# Patient Record
Sex: FEMALE
[De-identification: names, ages, dates, MRNs, and addresses within clinical notes are randomized per-mention and may not be internally consistent; named-entity substitution may affect disease eponyms.]

---

## 2018-09-08 ENCOUNTER — APPOINTMENT (OUTPATIENT)
Dept: INTERNAL MEDICINE | Facility: CLINIC | Age: 48
End: 2018-09-08

## 2018-09-29 ENCOUNTER — APPOINTMENT (OUTPATIENT)
Dept: INTERNAL MEDICINE | Facility: CLINIC | Age: 48
End: 2018-09-29
Payer: COMMERCIAL

## 2018-09-29 ENCOUNTER — LABORATORY RESULT (OUTPATIENT)
Age: 48
End: 2018-09-29

## 2018-09-29 VITALS
SYSTOLIC BLOOD PRESSURE: 134 MMHG | OXYGEN SATURATION: 97 % | HEIGHT: 64 IN | DIASTOLIC BLOOD PRESSURE: 86 MMHG | BODY MASS INDEX: 38.58 KG/M2 | TEMPERATURE: 98.8 F | WEIGHT: 226 LBS | HEART RATE: 85 BPM | RESPIRATION RATE: 16 BRPM

## 2018-09-29 DIAGNOSIS — Z82.49 FAMILY HISTORY OF ISCHEMIC HEART DISEASE AND OTHER DISEASES OF THE CIRCULATORY SYSTEM: ICD-10-CM

## 2018-09-29 DIAGNOSIS — Z87.39 PERSONAL HISTORY OF OTHER DISEASES OF THE MUSCULOSKELETAL SYSTEM AND CONNECTIVE TISSUE: ICD-10-CM

## 2018-09-29 DIAGNOSIS — Z81.1 FAMILY HISTORY OF ALCOHOL ABUSE AND DEPENDENCE: ICD-10-CM

## 2018-09-29 DIAGNOSIS — Z78.9 OTHER SPECIFIED HEALTH STATUS: ICD-10-CM

## 2018-09-29 DIAGNOSIS — Z87.19 PERSONAL HISTORY OF OTHER DISEASES OF THE DIGESTIVE SYSTEM: ICD-10-CM

## 2018-09-29 DIAGNOSIS — Z82.61 FAMILY HISTORY OF ARTHRITIS: ICD-10-CM

## 2018-09-29 DIAGNOSIS — H60.541 ACUTE ECZEMATOID OTITIS EXTERNA, RIGHT EAR: ICD-10-CM

## 2018-09-29 DIAGNOSIS — Z83.3 FAMILY HISTORY OF DIABETES MELLITUS: ICD-10-CM

## 2018-09-29 DIAGNOSIS — K92.1 MELENA: ICD-10-CM

## 2018-09-29 PROCEDURE — 36415 COLL VENOUS BLD VENIPUNCTURE: CPT

## 2018-09-29 PROCEDURE — 99204 OFFICE O/P NEW MOD 45 MIN: CPT | Mod: 25

## 2018-09-29 NOTE — PLAN
[FreeTextEntry1] : 47 yr old new Patient; Mult Co- morbidities\par #1 DM type 2- Uncontrolled , last HGB A 1c 9.6, repeat today, Dietary restrictions discussed, FS checking/ Logging, weight loss program\par #2 HTN- Controlled, same meds, low salt diet, weight loss\par #3 Hypothyroidism- Check Thyroid today\par #4 Hyperlipidemia- medication as directed, weight loss, exercise\par #5 Neuropathy- Discussed need to reduce HGB A 1 C , Foot Care by Podiatrist, safety measures\par #6 BMI 38%- Encouraged weight loss program, Weight Watchers diet, exercise\par #7 Blood in stool- Importance of F/U GI , if  significant worsening must go to ED \par #8 Otitis Externa- Keep ear dry as possible, Steroid cream out ear only\par Labs today, Had Flu vaccine at work, , F/U 1 month/ After GI Appt / Re-assess \par \par \par

## 2018-09-29 NOTE — COUNSELING
[Weight management counseling provided] : Weight management [Healthy eating counseling provided] : healthy eating [Weight Self Once Weekly] : Weight self once weekly [Keep Food Diary] : Keep food diary [Low Fat Diet] : Low fat diet [Decrease Portions] : Decrease food portions [Low Salt Diet] : Low salt diet [Patient Non-adherent to care plan] : Patient non-adherent to care plan [Financial issues] : Financial issues [Patient motivation] : Patient motivation [Good understanding] : Patient has a good understanding of lifestyle changes and the steps needed to achieve self management goals

## 2018-09-29 NOTE — PHYSICAL EXAM
[No Acute Distress] : no acute distress [Well Nourished] : well nourished [Well Developed] : well developed [Normal Oropharynx] : the oropharynx was normal [Normal TMs] : both tympanic membranes were normal [No JVD] : no jugular venous distention [No Respiratory Distress] : no respiratory distress  [Clear to Auscultation] : lungs were clear to auscultation bilaterally [Normal Rate] : normal rate  [No Edema] : there was no peripheral edema [No Stool to Guaiac] : no stool to guaiac [Normal Sphincter Tone] : normal sphincter tone [No Mass] : no mass [No CVA Tenderness] : no CVA  tenderness [No Joint Swelling] : no joint swelling [No Rash] : no rash [Normal Gait] : normal gait [Speech Grossly Normal] : speech grossly normal [Memory Grossly Normal] : memory grossly normal [Normal Affect] : the affect was normal [Alert and Oriented x3] : oriented to person, place, and time [de-identified] : +Obese in stature  [de-identified] : +Right ear membrane is duller than left, + Skin excoriated looking outer canal  [FreeTextEntry1] : external hemmorhoids, non swollen, do not appear inflamed , external rash , increase in redness only  [de-identified] : mild rash external anus

## 2018-09-29 NOTE — HISTORY OF PRESENT ILLNESS
[FreeTextEntry8] : 47 yr old presents First time to practice. Recently PCP Dr Paris retired. \par Reports she used to go to another comprehensive clinic but making appt's was too complicated. Presents to office for physical assessment. meds to be ordered , has multiple complaints. \par PMH: DM, HTN, Hyperlipidemia, hypothyroidism. Would like a renewal on her medications.  HgA1c - 9.6 - May 2018. Reports that she is non-compliant with her diabetic diet and does not check her blood glucose even though she is equipped with the glucometer machine, lancet and glucose strips. States that she will make an attempt to check her blood glucose. Realizes she needs to lose weight, but does not make any attempts to do so. Works full- time in [employer] , travels from [place]. \par \par Reports blood in stool  x 1 month, intermittently . Blood noted in stool as recently as yesterday and when she wipes herself. Believes she  may have hemorrhoids. Also reports irritation and white film from right ear canal x 4 months, has been using alcohol and Q tips, +Very itchy according to Patient. \par \par Comprehensive eye exam completed this year. Has not yet gone to Podiatrist for foot care/ assessment/evaluation.\par Influenza vaccine received September 2018 (last week, reported at her job) . Presents with note from Neurologist/ Dr. Lucas complaining of numbness/ tingling in her feet. \par \par \par \par \par \par \par \par \par

## 2018-09-29 NOTE — HEALTH RISK ASSESSMENT
[No falls in past year] : Patient reported no falls in the past year [0] : 2) Feeling down, depressed, or hopeless: Not at all (0) [] : No [de-identified] : none [de-identified] : Neurology  [[id number]] : 0

## 2018-09-29 NOTE — REVIEW OF SYSTEMS
[Vision Problems] : vision problems [Earache] : earache [Lower Ext Edema] : lower extremity edema [Incontinence] : incontinence [Negative] : Heme/Lymph [Back Pain] : back pain [Discharge] : no discharge [Pain] : no pain [Redness] : no redness [Dryness] : no dryness  [Itching] : no itching [Hearing Loss] : no hearing loss [Hoarseness] : no hoarseness [Nasal Discharge] : no nasal discharge [Postnasal Drip] : no postnasal drip [Chest Pain] : no chest pain [Palpitations] : no palpitations [Orthopnea] : no orthopnea [Abdominal Pain] : no abdominal pain [Nausea] : no nausea [Constipation] : no constipation [Diarrhea] : diarrhea [Vomiting] : no vomiting [Heartburn] : no heartburn [FreeTextEntry3] : prescription eyeglass changes x2 with the last year [FreeTextEntry4] : right side ear ache/ +Itchy , white film like discharge [FreeTextEntry7] : blood  in stool and when wiping post bowel movement [FreeTextEntry8] : intermittent incontinence urine

## 2018-10-03 LAB
BASOPHILS # BLD AUTO: 0.02 K/UL
BASOPHILS NFR BLD AUTO: 0.3 %
EOSINOPHIL # BLD AUTO: 0.16 K/UL
EOSINOPHIL NFR BLD AUTO: 2.1 %
HCT VFR BLD CALC: 50.4 %
HGB BLD-MCNC: 14.1 G/DL
IMM GRANULOCYTES NFR BLD AUTO: 0.1 %
LYMPHOCYTES # BLD AUTO: 2.77 K/UL
LYMPHOCYTES NFR BLD AUTO: 36.2 %
MAN DIFF?: NORMAL
MCHC RBC-ENTMCNC: 28 GM/DL
MCHC RBC-ENTMCNC: 29.8 PG
MCV RBC AUTO: 106.6 FL
MONOCYTES # BLD AUTO: 0.26 K/UL
MONOCYTES NFR BLD AUTO: 3.4 %
NEUTROPHILS # BLD AUTO: 4.44 K/UL
NEUTROPHILS NFR BLD AUTO: 57.9 %
PLATELET # BLD AUTO: 212 K/UL
RBC # BLD: 4.73 M/UL
RBC # FLD: 16.3 %
WBC # FLD AUTO: 7.66 K/UL

## 2018-10-05 LAB
ALBUMIN SERPL ELPH-MCNC: 4.6 G/DL
ALP BLD-CCNC: 85 U/L
ALT SERPL-CCNC: 65 U/L
ANION GAP SERPL CALC-SCNC: 14 MMOL/L
AST SERPL-CCNC: 62 U/L
BILIRUB SERPL-MCNC: 0.3 MG/DL
BUN SERPL-MCNC: 16 MG/DL
CALCIUM SERPL-MCNC: 9.6 MG/DL
CHLORIDE SERPL-SCNC: 102 MMOL/L
CHOLEST SERPL-MCNC: 160 MG/DL
CHOLEST/HDLC SERPL: 3.4 RATIO
CO2 SERPL-SCNC: 20 MMOL/L
CREAT SERPL-MCNC: 0.66 MG/DL
GLUCOSE SERPL-MCNC: 263 MG/DL
HBA1C MFR BLD HPLC: 10.6 %
HDLC SERPL-MCNC: 47 MG/DL
LDLC SERPL CALC-MCNC: 78 MG/DL
POTASSIUM SERPL-SCNC: 5.4 MMOL/L
PROT SERPL-MCNC: 7.5 G/DL
SODIUM SERPL-SCNC: 136 MMOL/L
TRIGL SERPL-MCNC: 174 MG/DL
TSH SERPL-ACNC: 1.93 UIU/ML

## 2018-12-08 ENCOUNTER — APPOINTMENT (OUTPATIENT)
Dept: INTERNAL MEDICINE | Facility: CLINIC | Age: 48
End: 2018-12-08
Payer: COMMERCIAL

## 2018-12-08 VITALS
HEART RATE: 84 BPM | BODY MASS INDEX: 37.39 KG/M2 | TEMPERATURE: 98.1 F | OXYGEN SATURATION: 97 % | HEIGHT: 64 IN | WEIGHT: 219 LBS

## 2018-12-08 VITALS — DIASTOLIC BLOOD PRESSURE: 80 MMHG | SYSTOLIC BLOOD PRESSURE: 132 MMHG

## 2018-12-08 DIAGNOSIS — K64.9 UNSPECIFIED HEMORRHOIDS: ICD-10-CM

## 2018-12-08 PROCEDURE — 99214 OFFICE O/P EST MOD 30 MIN: CPT

## 2018-12-08 NOTE — HEALTH RISK ASSESSMENT
[No falls in past year] : Patient reported no falls in the past year [0] : 2) Feeling down, depressed, or hopeless: Not at all (0) [] : No [de-identified] : none [de-identified] : yes gastro [[id number]] : 0

## 2018-12-08 NOTE — HISTORY OF PRESENT ILLNESS
[FreeTextEntry1] : 48 year old female in for F/U DM, Obesity, Hyperlipidemia, Hypothyroidism [de-identified] : 48 yr old  for  F/U. Recently PCP Dr. Paris retired. \par PMH: DM, HTN, Hyperlipidemia, hypothyroidism. Would like a renewal on her medications.  HgA1c -10.6 - September 2018. \par Reports that she is non-compliant with her diabetic diet but is trying to do better. Consumes protein shakes in the am and frozen meal shakes for lunch. Does not check her blood glucose even though she is equipped with the glucometer machine, lancet and glucose strips. Admits that she does not exercise  much. Has upcomin Appt with Endo Specialist which was strongly encouraged \par Request refill on metformin 1000 mg  with 3 refills  and [place] 60 mg. Seen by Gastro : + Hemorrhoids \par States she was non compliant with how she took her metformin- forgot to take medication - most days.\par States that she now compliant with her medication. Presently takes metformin 1/2  in am with a protein shake and the 1/2 in the afternoon with a frozen meal shake then 1000mg  at night.States that this process helps reduces - GI  upset\par Would like a referral - Endocrinologist.\par Works full- time in [employer] , travels from [place]. \par Will come in at the end of the month for repeat Lab work\par Comprehensive eye exam completed this year. Also seen by GI -2018.\par Influenza vaccine received September 2018 (reported at her job).\par \par \par \par \par \par \par \par \par   [FreeTextEntry8] : \par \par \par \par \par \par \par \par \par

## 2018-12-08 NOTE — COUNSELING
[Weight management counseling provided] : Weight management [Healthy eating counseling provided] : healthy eating [Activity counseling provided] : activity [Weight Self Once Weekly] : Weight self once weekly [Keep Food Diary] : Keep food diary [Low Fat Diet] : Low fat diet [Decrease Portions] : Decrease food portions [Low Salt Diet] : Low salt diet [None] : None [Good understanding] : Patient has a good understanding of lifestyle changes and the steps needed to achieve self management goals [de-identified] : Stress reduction

## 2018-12-08 NOTE — PLAN
[FreeTextEntry1] : 48 year old, DM, HTN, obesity, hyperlipidemia\par 1. DM type 2- uncontrolled, last HGA1c- 10.6 - will repeat at end of month, F/U Endo ASAP , Nutritionist needed , will Refer in future if Endo does not provide. \par 2. HTN- controlled with medication, low salt diet, encouraged weight loss\par 3. Hyperlipidemia- medication as directed, weight loss\par 4. BMI 38%- encourage weight loss program (weight watcher) exercise\par F/U in 3-4 weeks for repeat blood draw, medications refilled, Re-assess\par

## 2018-12-08 NOTE — PHYSICAL EXAM
[No Acute Distress] : no acute distress [Well Nourished] : well nourished [Well Developed] : well developed [Well-Appearing] : well-appearing [Normal Sclera/Conjunctiva] : normal sclera/conjunctiva [PERRL] : pupils equal round and reactive to light [EOMI] : extraocular movements intact [Normal Outer Ear/Nose] : the outer ears and nose were normal in appearance [Normal Oropharynx] : the oropharynx was normal [Supple] : supple [No Lymphadenopathy] : no lymphadenopathy [Thyroid Normal, No Nodules] : the thyroid was normal and there were no nodules present [No Respiratory Distress] : no respiratory distress  [Clear to Auscultation] : lungs were clear to auscultation bilaterally [No Accessory Muscle Use] : no accessory muscle use [Normal Rate] : normal rate  [Regular Rhythm] : with a regular rhythm [Normal S1, S2] : normal S1 and S2 [No Murmur] : no murmur heard [No Varicosities] : no varicosities [No Edema] : there was no peripheral edema [No Extremity Clubbing/Cyanosis] : no extremity clubbing/cyanosis [Soft] : abdomen soft [Non Tender] : non-tender [Non-distended] : non-distended [No Masses] : no abdominal mass palpated [Normal Bowel Sounds] : normal bowel sounds [Normal Posterior Cervical Nodes] : no posterior cervical lymphadenopathy [Normal Anterior Cervical Nodes] : no anterior cervical lymphadenopathy [No Spinal Tenderness] : no spinal tenderness [Grossly Normal Strength/Tone] : grossly normal strength/tone [No Rash] : no rash [Normal Gait] : normal gait [Coordination Grossly Intact] : coordination grossly intact [No Focal Deficits] : no focal deficits [Deep Tendon Reflexes (DTR)] : deep tendon reflexes were 2+ and symmetric [Normal Affect] : the affect was normal [Normal Insight/Judgement] : insight and judgment were intact [Speech Grossly Normal] : speech grossly normal [Normal Mood] : the mood was normal [de-identified] : Obese in stature

## 2018-12-08 NOTE — REVIEW OF SYSTEMS
[Vision Problems] : vision problems [Negative] : Psychiatric [Constipation] : no constipation [Vomiting] : no vomiting [Heartburn] : no heartburn [Muscle Weakness] : no muscle weakness [Back Pain] : no back pain [Easy Bleeding] : no easy bleeding [Swollen Glands] : no swollen glands [FreeTextEntry3] : seen by opthalmologist

## 2019-03-15 ENCOUNTER — RX RENEWAL (OUTPATIENT)
Age: 49
End: 2019-03-15

## 2019-05-31 ENCOUNTER — APPOINTMENT (OUTPATIENT)
Dept: INTERNAL MEDICINE | Facility: CLINIC | Age: 49
End: 2019-05-31
Payer: COMMERCIAL

## 2019-05-31 VITALS
WEIGHT: 216 LBS | HEART RATE: 79 BPM | DIASTOLIC BLOOD PRESSURE: 84 MMHG | TEMPERATURE: 97.1 F | OXYGEN SATURATION: 97 % | RESPIRATION RATE: 16 BRPM | HEIGHT: 64 IN | SYSTOLIC BLOOD PRESSURE: 140 MMHG | BODY MASS INDEX: 36.88 KG/M2

## 2019-05-31 PROCEDURE — 99214 OFFICE O/P EST MOD 30 MIN: CPT

## 2019-06-03 NOTE — PLAN
[FreeTextEntry1] : 48 yr old presents F/U \par #1 DM- Needs better control, dietary restrictions, FS Daily/ log, Stressed importance of having Endo Specialist to inquire newer types of meds which may help with weight loss also, Pt agreed \par #2 HTN- Needs better control, low salt diet, meds daily, weight loss\par #3 Hypothyroid- Stable, same dosage for now \par #4 Hyperlipidemia- Statin daily, weight loss, exercise \par F/U 1-2 months ( After Endo ) \par

## 2019-06-03 NOTE — PHYSICAL EXAM
[No Acute Distress] : no acute distress [Well Nourished] : well nourished [Well Developed] : well developed [Well-Appearing] : well-appearing [Normal Sclera/Conjunctiva] : normal sclera/conjunctiva [Normal Oropharynx] : the oropharynx was normal [No JVD] : no jugular venous distention [Supple] : supple [No Respiratory Distress] : no respiratory distress  [No Edema] : there was no peripheral edema [Normal Rate] : normal rate  [Normal Anterior Cervical Nodes] : no anterior cervical lymphadenopathy [No CVA Tenderness] : no CVA  tenderness [No Rash] : no rash [No Joint Swelling] : no joint swelling [Normal Gait] : normal gait [Speech Grossly Normal] : speech grossly normal [Normal Mood] : the mood was normal [de-identified] : Obese in stature

## 2019-06-03 NOTE — COUNSELING
[Weight management counseling provided] : Weight management [Healthy eating counseling provided] : healthy eating [Activity counseling provided] : activity [Target Wt Loss Goal ___] : Target weight loss goal [unfilled] lbs [Weight Self Once Weekly] : Weight self once weekly [Low Fat Diet] : Low fat diet [Low Salt Diet] : Low salt diet [Decrease Portions] : Decrease food portions [Keep Food Diary] : Keep food diary [___ min/wk activity recommended] : [unfilled] min/wk activity recommended [Other: ____] : [unfilled] [Walking] : Walking [Good understanding] : Patient has a good understanding of lifestyle changes and the steps needed to achieve self management goals

## 2019-06-03 NOTE — HISTORY OF PRESENT ILLNESS
[FreeTextEntry1] : F/U Obesity, DM, Hypothyroidism, Hyperlipidemia , Diabetic Neuropathy  [de-identified] : 48 yr old presents for F/U Above, feels well overall. Works full- time in [employer], reports good dietary control during work hours, then late at nights eats  too much and usually junk foods. Has not been able to exercise either, tries to add additional walking into her day as much as possible. Recently went to Eye doctor and reports her vision is declining , looks at computer all day also. Reports running out of BP meds for several days, thus her BP may be elevated today she thinks. Recently seen by Nutritionist who did mult labs ( 24 pages) HGB A 1C 9.7

## 2019-06-03 NOTE — HEALTH RISK ASSESSMENT
[No falls in past year] : Patient reported no falls in the past year [0] : 2) Feeling down, depressed, or hopeless: Not at all (0) [] : No [de-identified] : none [de-identified] : none [de-identified] : Rarely [de-identified] : walking [de-identified] : low carbs [[id number]] : 0

## 2019-07-09 ENCOUNTER — RX RENEWAL (OUTPATIENT)
Age: 49
End: 2019-07-09

## 2019-07-26 ENCOUNTER — APPOINTMENT (OUTPATIENT)
Dept: ENDOCRINOLOGY | Facility: CLINIC | Age: 49
End: 2019-07-26
Payer: COMMERCIAL

## 2019-07-26 VITALS
OXYGEN SATURATION: 98 % | SYSTOLIC BLOOD PRESSURE: 120 MMHG | RESPIRATION RATE: 16 BRPM | HEIGHT: 64 IN | DIASTOLIC BLOOD PRESSURE: 70 MMHG | WEIGHT: 218 LBS | HEART RATE: 78 BPM | BODY MASS INDEX: 37.22 KG/M2

## 2019-07-26 DIAGNOSIS — Z86.39 PERSONAL HISTORY OF OTHER ENDOCRINE, NUTRITIONAL AND METABOLIC DISEASE: ICD-10-CM

## 2019-07-26 DIAGNOSIS — R68.89 OTHER GENERAL SYMPTOMS AND SIGNS: ICD-10-CM

## 2019-07-26 LAB — GLUCOSE BLDC GLUCOMTR-MCNC: 184

## 2019-07-26 PROCEDURE — 95250 CONT GLUC MNTR PHYS/QHP EQP: CPT

## 2019-07-26 PROCEDURE — 99205 OFFICE O/P NEW HI 60 MIN: CPT

## 2019-07-26 PROCEDURE — 95251 CONT GLUC MNTR ANALYSIS I&R: CPT

## 2019-07-26 RX ORDER — METFORMIN HYDROCHLORIDE 1000 MG/1
1000 TABLET, COATED ORAL TWICE DAILY
Qty: 180 | Refills: 0 | Status: DISCONTINUED | COMMUNITY
End: 2019-07-26

## 2019-07-26 RX ORDER — METFORMIN HYDROCHLORIDE 1000 MG/1
1000 TABLET, COATED ORAL
Qty: 180 | Refills: 0 | Status: DISCONTINUED | COMMUNITY
Start: 2018-09-29 | End: 2019-07-26

## 2019-07-26 RX ORDER — BLOOD SUGAR DIAGNOSTIC
STRIP MISCELLANEOUS 3 TIMES DAILY
Qty: 1 | Refills: 2 | Status: ACTIVE | COMMUNITY
Start: 2019-07-26 | End: 1900-01-01

## 2019-07-26 RX ORDER — THYROID, PORCINE 60 MG/1
60 TABLET ORAL DAILY
Refills: 0 | Status: DISCONTINUED | COMMUNITY
End: 2019-07-26

## 2019-07-26 RX ORDER — LANCETS
EACH MISCELLANEOUS
Qty: 1 | Refills: 2 | Status: ACTIVE | COMMUNITY
Start: 2019-07-26 | End: 1900-01-01

## 2019-07-26 RX ORDER — SITAGLIPTIN 100 MG/1
100 TABLET, FILM COATED ORAL DAILY
Refills: 0 | Status: DISCONTINUED | COMMUNITY
End: 2019-07-26

## 2019-07-26 RX ORDER — MOMETASONE FUROATE 1 MG/G
0.1 OINTMENT TOPICAL DAILY
Qty: 1 | Refills: 1 | Status: DISCONTINUED | COMMUNITY
Start: 2018-09-29 | End: 2019-07-26

## 2019-07-26 NOTE — ASSESSMENT
[Carbohydrate Consistent Diet] : carbohydrate consistent diet [Hypoglycemia Management] : hypoglycemia management [Diabetes Foot Care] : diabetes foot care [Long Term Vascular Complications] : long term vascular complications of diabetes [Action and use of Insulin] : action and use of short and long-acting insulin [Self Monitoring of Blood Glucose] : self monitoring of blood glucose [FreeTextEntry1] : Current approaches to diabetes management are discussed with the patient. \par Target ranges for blood sugar, blood pressure and cholesterol reviewed, and risk reduction strategies verified. \par Hypoglycemia precautions reviewed with the patient. \par Suggested extensive diabetes education program, including nutritional and diabetes teaching and evaluation. \par Proper dietary restrictions and exercise routines discussed. 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

## 2019-07-26 NOTE — CONSULT LETTER
[Dear  ___] : Dear ~HAM, [Sincerely,] : Sincerely, [FreeTextEntry1] : Thank you for referring  Ms. LIZZY EDWARD to me for evaluation and treatment. Please, see attached consultation note. As always, if there are specific questions you would like to discuss, please feel free to contact me.\par Thank you for the courtesy of this evaluation.\par  [FreeTextEntry3] : Baldev Lanier MD, FACE, ECNU\par

## 2019-07-26 NOTE — HISTORY OF PRESENT ILLNESS
[FreeTextEntry1] : HISTORY OF PRESENT ILLNESS. \par \par Ms. EDWARD was diagnosed with Diabetes Mellitus Type 2 more than 15 years ago\par Reports history HTN, dyslipidemia, obesity, diabetic neuropathy, hypothyroidism. \par She denies CAD,  known complications of retinopathy, nephropathy. \par Presently on januvia 100 mg, metformin 1000 mg bid, armour 60 mg, benicar 40mg, crestor 10 mg HS\par She's not checking her blood sugars at home. States that forgets to take morning dose of metformin frequently.\par She states that her armour is expensive and wants to be switched to L-thyroxine.\par Fingerstick glucose in the office today is 184  mg/dL  (fasting). \par Diet: not following ADA\par Exercise: none\par \par Last dilated eye - 2018\par Last podiatry visit  - 2018\par Last cardiology evaluation - about 3 years ago\par Last stress test - about 3 years ago (per patient- normal)\par Last 2-D Echo - about 3 years ago (per patient- normal)\par Last nephrology evaluation - none\par Last neurology evaluation- 2018\par \par Lab review: a1c- 9.7, ast/alt- 60/63, LDL- 71, TSH- 2.22, neg celiac screening\par \par

## 2019-08-05 LAB
CREAT 24H UR-MCNC: 0.8 G/24 H
CREAT ?TM UR-MCNC: 77 MG/DL
PROT ?TM UR-MCNC: 24 HR
SPECIMEN VOL 24H UR: 1000 ML

## 2019-08-07 LAB
CORTIS 24H UR-MCNC: 24 H
CORTIS 24H UR-MRATE: 15 MCG/24 H
SPECIMEN VOL 24H UR: 1000 ML

## 2019-08-16 ENCOUNTER — RX RENEWAL (OUTPATIENT)
Age: 49
End: 2019-08-16

## 2019-08-23 ENCOUNTER — APPOINTMENT (OUTPATIENT)
Dept: ENDOCRINOLOGY | Facility: CLINIC | Age: 49
End: 2019-08-23
Payer: COMMERCIAL

## 2019-08-23 PROCEDURE — G0108 DIAB MANAGE TRN  PER INDIV: CPT

## 2019-09-20 ENCOUNTER — MEDICATION RENEWAL (OUTPATIENT)
Age: 49
End: 2019-09-20

## 2019-09-26 ENCOUNTER — MEDICATION RENEWAL (OUTPATIENT)
Age: 49
End: 2019-09-26

## 2019-09-26 RX ORDER — BLOOD-GLUCOSE METER
W/DEVICE EACH MISCELLANEOUS
Qty: 1 | Refills: 0 | Status: ACTIVE | COMMUNITY
Start: 2019-09-26 | End: 1900-01-01

## 2019-10-24 ENCOUNTER — APPOINTMENT (OUTPATIENT)
Dept: ENDOCRINOLOGY | Facility: CLINIC | Age: 49
End: 2019-10-24
Payer: COMMERCIAL

## 2019-10-24 VITALS — DIASTOLIC BLOOD PRESSURE: 80 MMHG | SYSTOLIC BLOOD PRESSURE: 132 MMHG

## 2019-10-24 LAB — GLUCOSE BLDC GLUCOMTR-MCNC: 212

## 2019-10-24 PROCEDURE — 82962 GLUCOSE BLOOD TEST: CPT

## 2019-10-24 PROCEDURE — 99214 OFFICE O/P EST MOD 30 MIN: CPT | Mod: 25

## 2019-10-24 PROCEDURE — 36415 COLL VENOUS BLD VENIPUNCTURE: CPT

## 2019-10-24 NOTE — ASSESSMENT
[Carbohydrate Consistent Diet] : carbohydrate consistent diet [Hypoglycemia Management] : hypoglycemia management [Long Term Vascular Complications] : long term vascular complications of diabetes [Diabetes Foot Care] : diabetes foot care [Self Monitoring of Blood Glucose] : self monitoring of blood glucose [Action and use of Insulin] : action and use of short and long-acting insulin [FreeTextEntry1] : - Soliqua 60 units sq daily (should switch to am)\par - metformin er 500 mg 4 tabs with dinner\par - steglatro 5 mg qd (R+B). hydration\par - Fiasp 6 un ac D. start testing tid ac\par - synthroid 100 mcg\par - continue Benicar, Crestor\par RTC 3 mos\par

## 2019-10-24 NOTE — HISTORY OF PRESENT ILLNESS
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 \par \par Ms. EDWARD was diagnosed with Diabetes Mellitus Type 2 more than 15 years ago\par Reports history HTN, dyslipidemia, obesity, diabetic neuropathy, hypothyroidism. \par She denies CAD,  known complications of retinopathy, nephropathy. \par Presently on januvia 100 mg, metformin 1000 mg bid, armour 60 mg, benicar 40mg, crestor 10 mg HS\par She's not checking her blood sugars at home. States that forgets to take morning dose of metformin frequently.\par She states that her armour is expensive and wants to be switched to L-thyroxine.\par Fingerstick glucose in the office today is 184  mg/dL  (fasting). \par Diet: not following ADA\par Exercise: none\par \par Last dilated eye - 2018\par Last podiatry visit  - 2018\par Last cardiology evaluation - about 3 years ago\par Last stress test - about 3 years ago (per patient- normal)\par Last 2-D Echo - about 3 years ago (per patient- normal)\par Last nephrology evaluation - none\par Last neurology evaluation- 2018\par \par Lab review: a1c- 9.7, ast/alt- 60/63, LDL- 71, TSH- 2.22, neg celiac screening\par \par

## 2019-10-25 LAB
25(OH)D3 SERPL-MCNC: 34.2 NG/ML
CHOLEST SERPL-MCNC: 166 MG/DL
CHOLEST/HDLC SERPL: 3.8 RATIO
ESTIMATED AVERAGE GLUCOSE: 197 MG/DL
FRUCTOSAMINE SERPL-MCNC: 300 UMOL/L
HBA1C MFR BLD HPLC: 8.5 %
HDLC SERPL-MCNC: 44 MG/DL
LDLC SERPL CALC-MCNC: 65 MG/DL
T4 FREE SERPL-MCNC: 1.3 NG/DL
TRIGL SERPL-MCNC: 287 MG/DL
VIT B12 SERPL-MCNC: 715 PG/ML

## 2019-11-26 ENCOUNTER — RX RENEWAL (OUTPATIENT)
Age: 49
End: 2019-11-26

## 2019-11-27 ENCOUNTER — APPOINTMENT (OUTPATIENT)
Dept: DERMATOLOGY | Facility: CLINIC | Age: 49
End: 2019-11-27
Payer: COMMERCIAL

## 2019-11-27 VITALS
DIASTOLIC BLOOD PRESSURE: 78 MMHG | HEIGHT: 64 IN | WEIGHT: 225 LBS | BODY MASS INDEX: 38.41 KG/M2 | SYSTOLIC BLOOD PRESSURE: 128 MMHG

## 2019-11-27 DIAGNOSIS — L73.9 FOLLICULAR DISORDER, UNSPECIFIED: ICD-10-CM

## 2019-11-27 PROCEDURE — 99203 OFFICE O/P NEW LOW 30 MIN: CPT

## 2020-01-10 ENCOUNTER — APPOINTMENT (OUTPATIENT)
Dept: INTERNAL MEDICINE | Facility: CLINIC | Age: 50
End: 2020-01-10
Payer: COMMERCIAL

## 2020-01-10 VITALS
SYSTOLIC BLOOD PRESSURE: 124 MMHG | TEMPERATURE: 97.9 F | OXYGEN SATURATION: 98 % | DIASTOLIC BLOOD PRESSURE: 80 MMHG | WEIGHT: 217 LBS | RESPIRATION RATE: 16 BRPM | BODY MASS INDEX: 37.05 KG/M2 | HEART RATE: 75 BPM | HEIGHT: 64 IN

## 2020-01-10 DIAGNOSIS — Z12.39 ENCOUNTER FOR OTHER SCREENING FOR MALIGNANT NEOPLASM OF BREAST: ICD-10-CM

## 2020-01-10 PROCEDURE — 36415 COLL VENOUS BLD VENIPUNCTURE: CPT

## 2020-01-10 PROCEDURE — 99396 PREV VISIT EST AGE 40-64: CPT | Mod: 25

## 2020-01-11 NOTE — HEALTH RISK ASSESSMENT
[Very Good] : ~his/her~ current health as very good [Good] : ~his/her~  mood as  good [Yes] : Yes [Monthly or less (1 pt)] : Monthly or less (1 point) [Never (0 pts)] : Never (0 points) [1 or 2 (0 pts)] : 1 or 2 (0 points) [No falls in past year] : Patient reported no falls in the past year [No] : In the past 12 months have you used drugs other than those required for medical reasons? No [0] : 2) Feeling down, depressed, or hopeless: Not at all (0) [HIV test declined] : HIV test declined [Hepatitis C test declined] : Hepatitis C test declined [With Family] : lives with family [Employed] : employed [Single] : single [Feels Safe at Home] : Feels safe at home [Smoke Detector] : smoke detector [Carbon Monoxide Detector] : carbon monoxide detector [Safety elements used in home] : safety elements used in home [Sunscreen] : uses sunscreen [Seat Belt] :  uses seat belt [None] : None [College] : College [Fully functional (bathing, dressing, toileting, transferring, walking, feeding)] : Fully functional (bathing, dressing, toileting, transferring, walking, feeding) [Fully functional (using the telephone, shopping, preparing meals, housekeeping, doing laundry, using] : Fully functional and needs no help or supervision to perform IADLs (using the telephone, shopping, preparing meals, housekeeping, doing laundry, using transportation, managing medications and managing finances) [Reports normal functional visual acuity (ie: able to read med bottle)] : Reports normal functional visual acuity [] : No [de-identified] : NONE [de-identified] : DERM AND ENDO [Audit-CScore] : 1 [[id number]] : 0 [de-identified] : low carb [de-identified] : walks when possible  [Learning/Retaining New Information] : denies difficulty learning/retaining new information [Change in mental status noted] : No change in mental status noted [Behavior] : denies difficulty with behavior [Language] : denies difficulty with language [Spatial Ability and Orientation] : denies difficulty with spatial ability and orientation [Reasoning] : denies difficulty with reasoning [Handling Complex Tasks] : denies difficulty handling complex tasks [Sexually Active] : not sexually active [High Risk Behavior] : no high risk behavior [Reports changes in hearing] : Reports no changes in hearing [Reports changes in dental health] : Reports no changes in dental health [Reports changes in vision] : Reports no changes in vision [Caregiver Concerns] : does not have caregiver concerns [Travel to Developing Areas] : does not  travel to developing areas [TB Exposure] : is not being exposed to tuberculosis [Guns at Home] : no guns at home [MammogramDate] : 01/6 [MammogramComments] : r [ColonoscopyDate] : 11/18 [ColonoscopyComments] : repeat in 5 years

## 2020-01-11 NOTE — PLAN
[FreeTextEntry1] : 49 yr old presents CPE\par \par #1 DM- Gaining better control, low carbs, Insulin as directed, F/U Endo\par \par #2 HTN- Controlled, low salt diet, weight loss , exercise \par \par #3 Hyperlipidemia- Low fat diet, weight loss, Statin daily \par \par #4 BMI 37- Continue weight loss program, POrtion control, exercise \par \par Full labs done in office today, Health Screenings discussed \par Need reports Specialist \par f/U 3-4 months

## 2020-01-11 NOTE — PHYSICAL EXAM
[No Acute Distress] : no acute distress [Well Nourished] : well nourished [Normal Sclera/Conjunctiva] : normal sclera/conjunctiva [Well Developed] : well developed [Well-Appearing] : well-appearing [PERRL] : pupils equal round and reactive to light [EOMI] : extraocular movements intact [Normal Oropharynx] : the oropharynx was normal [Normal Outer Ear/Nose] : the outer ears and nose were normal in appearance [No Lymphadenopathy] : no lymphadenopathy [No JVD] : no jugular venous distention [Supple] : supple [Thyroid Normal, No Nodules] : the thyroid was normal and there were no nodules present [No Accessory Muscle Use] : no accessory muscle use [No Respiratory Distress] : no respiratory distress  [Clear to Auscultation] : lungs were clear to auscultation bilaterally [Normal Rate] : normal rate  [Regular Rhythm] : with a regular rhythm [Normal S1, S2] : normal S1 and S2 [No Murmur] : no murmur heard [No Carotid Bruits] : no carotid bruits [No Varicosities] : no varicosities [No Abdominal Bruit] : a ~M bruit was not heard ~T in the abdomen [Pedal Pulses Present] : the pedal pulses are present [No Palpable Aorta] : no palpable aorta [No Edema] : there was no peripheral edema [No Extremity Clubbing/Cyanosis] : no extremity clubbing/cyanosis [Normal Appearance] : normal in appearance [No Nipple Discharge] : no nipple discharge [No Axillary Lymphadenopathy] : no axillary lymphadenopathy [Non Tender] : non-tender [Soft] : abdomen soft [No Masses] : no abdominal mass palpated [Non-distended] : non-distended [Normal Bowel Sounds] : normal bowel sounds [No HSM] : no HSM [Normal Posterior Cervical Nodes] : no posterior cervical lymphadenopathy [Declined Rectal Exam] : declined rectal exam [Normal Anterior Cervical Nodes] : no anterior cervical lymphadenopathy [No CVA Tenderness] : no CVA  tenderness [No Spinal Tenderness] : no spinal tenderness [No Joint Swelling] : no joint swelling [Grossly Normal Strength/Tone] : grossly normal strength/tone [No Rash] : no rash [Coordination Grossly Intact] : coordination grossly intact [No Focal Deficits] : no focal deficits [Speech Grossly Normal] : speech grossly normal [Normal Gait] : normal gait [Deep Tendon Reflexes (DTR)] : deep tendon reflexes were 2+ and symmetric [Normal Affect] : the affect was normal [Memory Grossly Normal] : memory grossly normal [Normal Mood] : the mood was normal [Alert and Oriented x3] : oriented to person, place, and time [Normal Insight/Judgement] : insight and judgment were intact [None] : no ulcers in either foot were found [] : both feet [Scoliosis] : no scoliosis [Kyphosis] : no kyphosis [de-identified] : Obese in stature  [de-identified] : pendelous / Large girth [de-identified] : Went to [facility], needs updated Appt  [de-identified] : slight diminished Elliott toes upper approx #2 #3  [TWNoteComboBox4] : +2 [TWNoteComboBox3] : +2

## 2020-01-11 NOTE — COUNSELING
[Potential consequences of obesity discussed] : Potential consequences of obesity discussed [Benefits of weight loss discussed] : Benefits of weight loss discussed [Structured Weight Management Program suggested:] : Structured weight management program suggested [Encouraged to maintain food diary] : Encouraged to maintain food diary [Encouraged to increase physical activity] : Encouraged to increase physical activity [Encouraged to use exercise tracking device] : Encouraged to use exercise tracking device [Weigh Self Weekly] : weigh self weekly [Decrease Portions] : decrease portions [____ min/wk Activity] : [unfilled] min/wk activity [Keep Food Diary] : keep food diary [Other: ____] : [unfilled] [Good understanding] : Patient has a good understanding of lifestyle changes and steps needed to achieve self management goal [FreeTextEntry2] : Continue weight loss program  [de-identified] : Lifestyle changes, weight loss, exercise

## 2020-01-14 LAB
25(OH)D3 SERPL-MCNC: 54.6 NG/ML
ALBUMIN SERPL ELPH-MCNC: 4.8 G/DL
ALP BLD-CCNC: 76 U/L
ALT SERPL-CCNC: 34 U/L
ANION GAP SERPL CALC-SCNC: 15 MMOL/L
AST SERPL-CCNC: 31 U/L
BASOPHILS # BLD AUTO: 0.05 K/UL
BASOPHILS NFR BLD AUTO: 0.6 %
BILIRUB SERPL-MCNC: 0.4 MG/DL
BUN SERPL-MCNC: 17 MG/DL
CALCIUM SERPL-MCNC: 9.6 MG/DL
CHLORIDE SERPL-SCNC: 103 MMOL/L
CHOLEST SERPL-MCNC: 165 MG/DL
CHOLEST/HDLC SERPL: 3.8 RATIO
CO2 SERPL-SCNC: 22 MMOL/L
CREAT SERPL-MCNC: 0.7 MG/DL
EOSINOPHIL # BLD AUTO: 0.1 K/UL
EOSINOPHIL NFR BLD AUTO: 1.3 %
ESTIMATED AVERAGE GLUCOSE: 148 MG/DL
GLUCOSE SERPL-MCNC: 86 MG/DL
HBA1C MFR BLD HPLC: 6.8 %
HCT VFR BLD CALC: 49.2 %
HDLC SERPL-MCNC: 44 MG/DL
HGB BLD-MCNC: 15.4 G/DL
IMM GRANULOCYTES NFR BLD AUTO: 0.3 %
LDLC SERPL CALC-MCNC: 73 MG/DL
LYMPHOCYTES # BLD AUTO: 3.31 K/UL
LYMPHOCYTES NFR BLD AUTO: 41.8 %
MAN DIFF?: NORMAL
MCHC RBC-ENTMCNC: 29.4 PG
MCHC RBC-ENTMCNC: 31.3 GM/DL
MCV RBC AUTO: 94.1 FL
MONOCYTES # BLD AUTO: 0.51 K/UL
MONOCYTES NFR BLD AUTO: 6.4 %
NEUTROPHILS # BLD AUTO: 3.93 K/UL
NEUTROPHILS NFR BLD AUTO: 49.6 %
PLATELET # BLD AUTO: 243 K/UL
POTASSIUM SERPL-SCNC: 4.2 MMOL/L
PROT SERPL-MCNC: 7.4 G/DL
RBC # BLD: 5.23 M/UL
RBC # FLD: 13.3 %
SODIUM SERPL-SCNC: 140 MMOL/L
TRIGL SERPL-MCNC: 239 MG/DL
TSH SERPL-ACNC: 0.29 UIU/ML
WBC # FLD AUTO: 7.92 K/UL

## 2020-01-23 ENCOUNTER — APPOINTMENT (OUTPATIENT)
Dept: ENDOCRINOLOGY | Facility: CLINIC | Age: 50
End: 2020-01-23
Payer: COMMERCIAL

## 2020-01-23 VITALS
OXYGEN SATURATION: 98 % | SYSTOLIC BLOOD PRESSURE: 130 MMHG | DIASTOLIC BLOOD PRESSURE: 80 MMHG | HEIGHT: 64 IN | WEIGHT: 221 LBS | HEART RATE: 88 BPM | BODY MASS INDEX: 37.73 KG/M2 | RESPIRATION RATE: 16 BRPM

## 2020-01-23 LAB — GLUCOSE BLDC GLUCOMTR-MCNC: 117

## 2020-01-23 PROCEDURE — 82962 GLUCOSE BLOOD TEST: CPT

## 2020-01-23 PROCEDURE — 99214 OFFICE O/P EST MOD 30 MIN: CPT | Mod: 25

## 2020-01-23 NOTE — HISTORY OF PRESENT ILLNESS
[FreeTextEntry1] : 48 yo F f/u for multiple issues\par \par *** Jan 23, 2020 ***\par \par on Soliqua 60 units sq daily,  metformin er 500 mg 4 tabs with dinner,  steglatro 5 mg qd ,  Fiasp 6 un ac D,  synthroid 100 mcg, benicar 40mg, crestor 10 mg HS\par \par a1c- 6.8, TSH- 0.29, LDL- 73, TG- 239\par \par Log: fbs-  116- 167, ac L- 123-127, ac D- 172- 248\par \par *** Oct 24, 2019 ***\par \par on soliqua 57 un qd,  metformin er 500 mg 4 tabs with dinner, synthroid 100 mcg, benicar 40mg, crestor 10 mg HS\par 24h UFC- 15\par no repeat labs yet\par log: fbs- 110-183, with few episodes of mid 200's (when forgets soliqua), p/D- 183-327\par \par HISTORY OF PRESENT ILLNESS. \par \par Ms. EDWARD was diagnosed with Diabetes Mellitus Type 2 more than 15 years ago\par Reports history HTN, dyslipidemia, obesity, diabetic neuropathy, hypothyroidism. \par She denies CAD,  known complications of retinopathy, nephropathy. \par Presently on januvia 100 mg, metformin 1000 mg bid, armour 60 mg, benicar 40mg, crestor 10 mg HS\par She's not checking her blood sugars at home. States that forgets to take morning dose of metformin frequently.\par She states that her armour is expensive and wants to be switched to L-thyroxine.\par Fingerstick glucose in the office today is 184  mg/dL  (fasting). \par Diet: not following ADA\par Exercise: none\par \par Last dilated eye - 2018\par Last podiatry visit  - 2018\par Last cardiology evaluation - about 3 years ago\par Last stress test - about 3 years ago (per patient- normal)\par Last 2-D Echo - about 3 years ago (per patient- normal)\par Last nephrology evaluation - none\par Last neurology evaluation- 2018\par \par Lab review: a1c- 9.7, ast/alt- 60/63, LDL- 71, TSH- 2.22, neg celiac screening\par \par

## 2020-01-23 NOTE — ASSESSMENT
[Carbohydrate Consistent Diet] : carbohydrate consistent diet [Hypoglycemia Management] : hypoglycemia management [Diabetes Foot Care] : diabetes foot care [Long Term Vascular Complications] : long term vascular complications of diabetes [Action and use of Insulin] : action and use of short and long-acting insulin [Self Monitoring of Blood Glucose] : self monitoring of blood glucose [FreeTextEntry1] : - Soliqua 60 units sq daily (should switch to am)\par - metformin er 500 mg 4 tabs with dinner\par - incr steglatro 15 mg qd (R+B). hydration\par - incr  Fiasp 10 un ac D. \par - synthroid 100 mcg, might need adjustment next visit\par - continue Benicar, Crestor\par RTC 3 mos\par

## 2020-01-27 ENCOUNTER — TRANSCRIPTION ENCOUNTER (OUTPATIENT)
Age: 50
End: 2020-01-27

## 2020-01-31 ENCOUNTER — TRANSCRIPTION ENCOUNTER (OUTPATIENT)
Age: 50
End: 2020-01-31

## 2020-02-03 ENCOUNTER — TRANSCRIPTION ENCOUNTER (OUTPATIENT)
Age: 50
End: 2020-02-03

## 2020-02-18 RX ORDER — LANCETS
EACH MISCELLANEOUS 3 TIMES DAILY
Qty: 2 | Refills: 2 | Status: ACTIVE | COMMUNITY
Start: 2019-09-26 | End: 1900-01-01

## 2020-04-08 ENCOUNTER — TRANSCRIPTION ENCOUNTER (OUTPATIENT)
Age: 50
End: 2020-04-08

## 2020-04-15 ENCOUNTER — TRANSCRIPTION ENCOUNTER (OUTPATIENT)
Age: 50
End: 2020-04-15

## 2020-04-27 ENCOUNTER — APPOINTMENT (OUTPATIENT)
Dept: ENDOCRINOLOGY | Facility: CLINIC | Age: 50
End: 2020-04-27
Payer: COMMERCIAL

## 2020-04-27 PROCEDURE — 99214 OFFICE O/P EST MOD 30 MIN: CPT | Mod: 95

## 2020-04-27 NOTE — HISTORY OF PRESENT ILLNESS
[Home] : at home, [unfilled] , at the time of the visit. [Patient] : the patient [Medical Office: ([facility])___] : at the medical office located in  [Self] : self [FreeTextEntry1] : 50 yo F f/u for multiple issues\par \par *** Televisit  Apr 27, 2020 ***\par \par feels well, no new c/o\par on Soliqua 60 units sq daily, metformin er 500 mg 4 tabs with dinner, steglatro 15 mg qd, Fiasp 10 un ac D, synthroid 100 mcg, benicar 40mg, crestor 10 mg HS\par \par Reports Average AM BS  -126, highest 169, lowest 91 ( midday)\par Average PM BS levels - 192, highest 322. lowest 116\par \par a1c - 6.3\par TG- 224, LDL- 70\par \par *** Jan 23, 2020 ***\par \par on Soliqua 60 units sq daily,  metformin er 500 mg 4 tabs with dinner,  steglatro 5 mg qd ,  Fiasp 6 un ac D,  synthroid 100 mcg, benicar 40mg, crestor 10 mg HS\par \par a1c- 6.8, TSH- 0.29, LDL- 73, TG- 239\par \par Log: fbs-  116- 167, ac L- 123-127, ac D- 172- 248\par \par *** Oct 24, 2019 ***\par \par on soliqua 57 un qd,  metformin er 500 mg 4 tabs with dinner, synthroid 100 mcg, benicar 40mg, crestor 10 mg HS\par 24h UFC- 15\par no repeat labs yet\par log: fbs- 110-183, with few episodes of mid 200's (when forgets soliqua), p/D- 183-327\par \par HISTORY OF PRESENT ILLNESS. \par \par Ms. EDWARD was diagnosed with Diabetes Mellitus Type 2 more than 15 years ago\par Reports history HTN, dyslipidemia, obesity, diabetic neuropathy, hypothyroidism. \par She denies CAD,  known complications of retinopathy, nephropathy. \par Presently on januvia 100 mg, metformin 1000 mg bid, armour 60 mg, benicar 40mg, crestor 10 mg HS\par She's not checking her blood sugars at home. States that forgets to take morning dose of metformin frequently.\par She states that her armour is expensive and wants to be switched to L-thyroxine.\par Fingerstick glucose in the office today is 184  mg/dL  (fasting). \par Diet: not following ADA\par Exercise: none\par \par Last dilated eye - 2018\par Last podiatry visit  - 2018\par Last cardiology evaluation - about 3 years ago\par Last stress test - about 3 years ago (per patient- normal)\par Last 2-D Echo - about 3 years ago (per patient- normal)\par Last nephrology evaluation - none\par Last neurology evaluation- 2018\par \par Lab review: a1c- 9.7, ast/alt- 60/63, LDL- 71, TSH- 2.22, neg celiac screening\par \par

## 2020-04-27 NOTE — REASON FOR VISIT
[Follow - Up] : a follow-up visit [DM Type 2] : DM Type 2 [Hypothyroidism] : hypothyroidism [Other___] : [unfilled]

## 2020-04-27 NOTE — ASSESSMENT
[Carbohydrate Consistent Diet] : carbohydrate consistent diet [Hypoglycemia Management] : hypoglycemia management [Diabetes Foot Care] : diabetes foot care [Long Term Vascular Complications] : long term vascular complications of diabetes [Action and use of Insulin] : action and use of short and long-acting insulin [Self Monitoring of Blood Glucose] : self monitoring of blood glucose [FreeTextEntry1] : - Soliqua 60 units sq daily (should switch to am)\par - metformin er 500 mg 4 tabs with dinner, steglatro 15 mg qd,  Fiasp 10 un ac D. \par - synthroid 100 mcg, might need adjustment next visit\par - continue Davian Triplett\par RTC 3 mos\par

## 2020-05-13 ENCOUNTER — APPOINTMENT (OUTPATIENT)
Dept: INTERNAL MEDICINE | Facility: CLINIC | Age: 50
End: 2020-05-13
Payer: COMMERCIAL

## 2020-05-13 PROCEDURE — 99214 OFFICE O/P EST MOD 30 MIN: CPT

## 2020-05-13 NOTE — HEALTH RISK ASSESSMENT
[Yes] : Yes [Monthly or less (1 pt)] : Monthly or less (1 point) [1 or 2 (0 pts)] : 1 or 2 (0 points) [Never (0 pts)] : Never (0 points) [No] : In the past 12 months have you used drugs other than those required for medical reasons? 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

## 2020-05-13 NOTE — PHYSICAL EXAM
[No Acute Distress] : no acute distress [Well Nourished] : well nourished [Well Developed] : well developed [Well-Appearing] : well-appearing [Normal Sclera/Conjunctiva] : normal sclera/conjunctiva [No Rash] : no rash [Speech Grossly Normal] : speech grossly normal [Memory Grossly Normal] : memory grossly normal [Normal Affect] : the affect was normal [Normal Mood] : the mood was normal [Alert and Oriented x3] : oriented to person, place, and time [Normal Insight/Judgement] : insight and judgment were intact [de-identified] : obese in stature  [de-identified] : Respirations normal, speaking clearly  [de-identified] : mood is pleasant as always, Smiling during interview

## 2020-05-13 NOTE — COUNSELING
[Potential consequences of obesity discussed] : Potential consequences of obesity discussed [Benefits of weight loss discussed] : Benefits of weight loss discussed [Encouraged to maintain food diary] : Encouraged to maintain food diary [Structured Weight Management Program suggested:] : Structured weight management program suggested [Encouraged to increase physical activity] : Encouraged to increase physical activity [Weigh Self Weekly] : weigh self weekly [Encouraged to use exercise tracking device] : Encouraged to use exercise tracking device [Decrease Portions] : decrease portions [____ min/wk Activity] : [unfilled] min/wk activity [Keep Food Diary] : keep food diary [Good understanding] : Patient has a good understanding of disease, goals and obesity follow-up plan [FreeTextEntry2] : Importance of increase in activity, has desk type job

## 2020-05-13 NOTE — HISTORY OF PRESENT ILLNESS
[Home] : at home, [unfilled] , at the time of the visit. [Medical Office: ([facility])___] : at the medical office located in  [Patient] : the patient [Self] : self [FreeTextEntry1] : F/U DM, Obesity, HTN, Hyperlipidemia, Hypothyroidism [de-identified] : 49 yr old presents Telehealth/ Video Screen today, feels well overall. Has been working from home during COVID outbreak, lives with Mother. Both have been symptom free. Sees Michael on regular basis which has been very helpful to her, HGB A1C down to 6.3. Struggles with her weight, admits to gaining few pounds since COVID Outbreak, gets out and takes walk sometimes weather permitting. Hope to be going back to work in [place] in few weeks. Reports BS ranges 120- 180,   BP normal readings. Labs reviewed by Dr. Hair / Michael

## 2020-05-13 NOTE — ASSESSMENT
[FreeTextEntry1] : 49 yr old F/U / Telehealth visit\par \par #1 DM- Controlled, continue dietary restrictions, FS Logging, meds daily, importance of increase in activity \par \par #2 HTN- ? Controlled, according to Patient, weight loss, home BP monitoring/ Logging\par \par #3 Hypothyroidism- Controlled, continue Synthroid daily\par \par #4 Hyperlipidemia- Continue Statin daily\par \par F/U 2-3 months in office visit/ Labs \par  meds renewed \par

## 2020-06-05 ENCOUNTER — APPOINTMENT (OUTPATIENT)
Dept: INTERNAL MEDICINE | Facility: CLINIC | Age: 50
End: 2020-06-05
Payer: COMMERCIAL

## 2020-06-05 VITALS
RESPIRATION RATE: 16 BRPM | HEIGHT: 64 IN | WEIGHT: 207 LBS | BODY MASS INDEX: 35.34 KG/M2 | OXYGEN SATURATION: 98 % | TEMPERATURE: 97.8 F | HEART RATE: 90 BPM | DIASTOLIC BLOOD PRESSURE: 82 MMHG | SYSTOLIC BLOOD PRESSURE: 126 MMHG

## 2020-06-05 DIAGNOSIS — M77.8 OTHER ENTHESOPATHIES, NOT ELSEWHERE CLASSIFIED: ICD-10-CM

## 2020-06-05 DIAGNOSIS — Z20.828 CONTACT WITH AND (SUSPECTED) EXPOSURE TO OTHER VIRAL COMMUNICABLE DISEASES: ICD-10-CM

## 2020-06-05 PROCEDURE — 36415 COLL VENOUS BLD VENIPUNCTURE: CPT

## 2020-06-05 PROCEDURE — 99213 OFFICE O/P EST LOW 20 MIN: CPT | Mod: 25

## 2020-06-06 PROBLEM — M77.8 WRIST TENDONITIS: Status: ACTIVE | Noted: 2020-06-06

## 2020-06-06 NOTE — PHYSICAL EXAM
[No Acute Distress] : no acute distress [Well Nourished] : well nourished [Well Developed] : well developed [Well-Appearing] : well-appearing [Normal Sclera/Conjunctiva] : normal sclera/conjunctiva [No JVD] : no jugular venous distention [No Respiratory Distress] : no respiratory distress  [No Accessory Muscle Use] : no accessory muscle use [Normal Rate] : normal rate  [Clear to Auscultation] : lungs were clear to auscultation bilaterally [No CVA Tenderness] : no CVA  tenderness [No Edema] : there was no peripheral edema [No Joint Swelling] : no joint swelling [Coordination Grossly Intact] : coordination grossly intact [No Rash] : no rash [Speech Grossly Normal] : speech grossly normal [Normal Affect] : the affect was normal [Memory Grossly Normal] : memory grossly normal [Normal Mood] : the mood was normal [Alert and Oriented x3] : oriented to person, place, and time [Normal Insight/Judgement] : insight and judgment were intact [de-identified] : wearing mask as per COVID guidelines

## 2020-06-06 NOTE — COUNSELING
[Other: ____] : [unfilled] [Good understanding] : Patient has a good understanding of lifestyle changes and steps needed to achieve self management goal

## 2020-06-06 NOTE — ASSESSMENT
[FreeTextEntry1] : 49 yr old presents today F/U \par \par #1 DM- Gaining much better control, weight loss, exercise, F/U Endo \par \par #2 Wrist Tendernitis- F/U Ortho , Anti -inflammatory / protect stomach \par \par #3 COVID exposure- Will do antibody testing today, discussed risks/ benefits testing\par \par Labs done in office today, will call with results \par \par F/U 3-4 months

## 2020-06-06 NOTE — HEALTH RISK ASSESSMENT
[No] : In the past 12 months have you used drugs other than those required for medical reasons? 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

## 2020-06-06 NOTE — HISTORY OF PRESENT ILLNESS
[FreeTextEntry1] : F/U DM, HTN, COVID testing  [de-identified] : 49 yr old presents for above, feels well overall, recently seen by Dr. Anne / Ortho for Elliott wrist pain , placed on Meloxicam by Ortho. Seen by Endo regular basis, BS doing much better, losing weight.  interested in COVID antibody testing since she believes she had a sick friend over her house and is planning on going back into office soon, has been working from home during Pandemic.

## 2020-06-06 NOTE — REVIEW OF SYSTEMS
[Joint Pain] : joint pain [Negative] : Heme/Lymph [Joint Stiffness] : joint stiffness [FreeTextEntry9] : abilio wrist pain

## 2020-06-07 LAB
SARS-COV-2 IGG SERPL IA-ACNC: <3.8 AU/ML
SARS-COV-2 IGG SERPL QL IA: NEGATIVE

## 2020-09-21 ENCOUNTER — APPOINTMENT (OUTPATIENT)
Dept: ENDOCRINOLOGY | Facility: CLINIC | Age: 50
End: 2020-09-21
Payer: COMMERCIAL

## 2020-09-21 VITALS
DIASTOLIC BLOOD PRESSURE: 70 MMHG | RESPIRATION RATE: 16 BRPM | HEART RATE: 94 BPM | TEMPERATURE: 98.2 F | BODY MASS INDEX: 35.34 KG/M2 | OXYGEN SATURATION: 98 % | SYSTOLIC BLOOD PRESSURE: 112 MMHG | WEIGHT: 207 LBS | HEIGHT: 64 IN

## 2020-09-21 LAB — GLUCOSE BLDC GLUCOMTR-MCNC: 89

## 2020-09-21 PROCEDURE — 82962 GLUCOSE BLOOD TEST: CPT

## 2020-09-21 PROCEDURE — 99214 OFFICE O/P EST MOD 30 MIN: CPT

## 2020-09-21 RX ORDER — BLOOD SUGAR DIAGNOSTIC
STRIP MISCELLANEOUS
Qty: 300 | Refills: 3 | Status: ACTIVE | COMMUNITY
Start: 2019-09-26 | End: 1900-01-01

## 2020-09-21 NOTE — ASSESSMENT
[Carbohydrate Consistent Diet] : carbohydrate consistent diet [Hypoglycemia Management] : hypoglycemia management [Diabetes Foot Care] : diabetes foot care [Long Term Vascular Complications] : long term vascular complications of diabetes [Action and use of Insulin] : action and use of short and long-acting insulin [Self Monitoring of Blood Glucose] : self monitoring of blood glucose [FreeTextEntry1] : - obtain most recent labs\par - Soliqua 60 units sq daily (should switch to am)\par - metformin er 500 mg 4 tabs with dinner, steglatro 15 mg qd,  Fiasp 10 un ac D. \par - synthroid 100 mcg, might need adjustment next visit\par - continue Benicar Crestor\par RTC 3 mos

## 2020-09-21 NOTE — HISTORY OF PRESENT ILLNESS
[FreeTextEntry1] : 50 yo F f/u for multiple issues\par \par *** Sep 21, 2020 ***\par \par on Soliqua 60 units sq daily, metformin er 500 mg 4 tabs with dinner, steglatro 15 mg qd, Humalog 10 un ac D, synthroid 100 mcg, benicar 40mg, crestor 10 mg HS\par off neurontin- "was not working", switched to nabumetone\par reports fbs-  110's, ppg- not checking\par feels well overall. lost 14 lbs since 01/20\par \par \par *** Televisit  Apr 27, 2020 ***\par \par feels well, no new c/o\par on Soliqua 60 units sq daily, metformin er 500 mg 4 tabs with dinner, steglatro 15 mg qd, Fiasp 10 un ac D, synthroid 100 mcg, benicar 40mg, crestor 10 mg HS\par \par Reports Average AM BS  -126, highest 169, lowest 91 ( midday)\par Average PM BS levels - 192, highest 322. lowest 116\par \par a1c - 6.3\par TG- 224, LDL- 70\par \par *** Jan 23, 2020 ***\par \par on Soliqua 60 units sq daily,  metformin er 500 mg 4 tabs with dinner,  steglatro 5 mg qd ,  Fiasp 6 un ac D,  synthroid 100 mcg, benicar 40mg, crestor 10 mg HS\par \par a1c- 6.8, TSH- 0.29, LDL- 73, TG- 239\par \par Log: fbs-  116- 167, ac L- 123-127, ac D- 172- 248\par \par *** Oct 24, 2019 ***\par \par on soliqua 57 un qd,  metformin er 500 mg 4 tabs with dinner, synthroid 100 mcg, benicar 40mg, crestor 10 mg HS\par 24h UFC- 15\par no repeat labs yet\par log: fbs- 110-183, with few episodes of mid 200's (when forgets soliqua), p/D- 183-327\par \par HISTORY OF PRESENT ILLNESS. \par \par Ms. EDWARD was diagnosed with Diabetes Mellitus Type 2 more than 15 years ago\par Reports history HTN, dyslipidemia, obesity, diabetic neuropathy, hypothyroidism. \par She denies CAD,  known complications of retinopathy, nephropathy. \par Presently on januvia 100 mg, metformin 1000 mg bid, armour 60 mg, benicar 40mg, crestor 10 mg HS\par She's not checking her blood sugars at home. States that forgets to take morning dose of metformin frequently.\par She states that her armour is expensive and wants to be switched to L-thyroxine.\par Fingerstick glucose in the office today is 184  mg/dL  (fasting). \par Diet: not following ADA\par Exercise: none\par \par Last dilated eye - 2018\par Last podiatry visit  - 2018\par Last cardiology evaluation - about 3 years ago\par Last stress test - about 3 years ago (per patient- normal)\par Last 2-D Echo - about 3 years ago (per patient- normal)\par Last nephrology evaluation - none\par Last neurology evaluation- 2018\par \par Lab review: a1c- 9.7, ast/alt- 60/63, LDL- 71, TSH- 2.22, neg celiac screening\par \par

## 2020-09-23 ENCOUNTER — RX RENEWAL (OUTPATIENT)
Age: 50
End: 2020-09-23

## 2020-09-28 ENCOUNTER — TRANSCRIPTION ENCOUNTER (OUTPATIENT)
Age: 50
End: 2020-09-28

## 2020-10-12 ENCOUNTER — APPOINTMENT (OUTPATIENT)
Dept: ENDOCRINOLOGY | Facility: CLINIC | Age: 50
End: 2020-10-12
Payer: COMMERCIAL

## 2020-10-12 PROCEDURE — G0108 DIAB MANAGE TRN  PER INDIV: CPT

## 2020-11-03 ENCOUNTER — APPOINTMENT (OUTPATIENT)
Dept: ENDOCRINOLOGY | Facility: CLINIC | Age: 50
End: 2020-11-03
Payer: COMMERCIAL

## 2020-11-03 PROCEDURE — 99072 ADDL SUPL MATRL&STAF TM PHE: CPT

## 2020-11-03 PROCEDURE — G0108 DIAB MANAGE TRN  PER INDIV: CPT

## 2020-12-06 ENCOUNTER — RX RENEWAL (OUTPATIENT)
Age: 50
End: 2020-12-06

## 2020-12-10 ENCOUNTER — RX RENEWAL (OUTPATIENT)
Age: 50
End: 2020-12-10

## 2020-12-11 ENCOUNTER — RX RENEWAL (OUTPATIENT)
Age: 50
End: 2020-12-11

## 2020-12-31 ENCOUNTER — APPOINTMENT (OUTPATIENT)
Dept: ENDOCRINOLOGY | Facility: CLINIC | Age: 50
End: 2020-12-31
Payer: COMMERCIAL

## 2020-12-31 VITALS
HEART RATE: 80 BPM | RESPIRATION RATE: 17 BRPM | WEIGHT: 205 LBS | OXYGEN SATURATION: 98 % | TEMPERATURE: 98 F | HEIGHT: 64 IN | DIASTOLIC BLOOD PRESSURE: 60 MMHG | BODY MASS INDEX: 35 KG/M2 | SYSTOLIC BLOOD PRESSURE: 120 MMHG

## 2020-12-31 LAB — GLUCOSE BLDC GLUCOMTR-MCNC: 114

## 2020-12-31 PROCEDURE — 95251 CONT GLUC MNTR ANALYSIS I&R: CPT

## 2020-12-31 PROCEDURE — 99072 ADDL SUPL MATRL&STAF TM PHE: CPT

## 2020-12-31 PROCEDURE — 99214 OFFICE O/P EST MOD 30 MIN: CPT | Mod: 25

## 2020-12-31 PROCEDURE — 82962 GLUCOSE BLOOD TEST: CPT

## 2020-12-31 NOTE — HISTORY OF PRESENT ILLNESS
[FreeTextEntry1] : 51 yo F f/u for multiple issues\par \par *** Dec 31, 2020 ***\par \par feels great. lost more weight. walks more\par on Soliqua 60 units sq daily, metformin er 500 mg 4 tabs with dinner, steglatro 15 mg qd, Humalog 10 un ac D, synthroid 100 mcg, benicar 40mg, crestor 10 mg HS\par \par a1c- 5.6, LDL- 80, TSH- 0.53, FT4- 1.4\par \par on Dexcom G6\par \par Date of download:  12/31/20\par Diabetes Medications and Dosage: as above\par Indication for CGMS: verify a change in the treatment regimen, identify periods of hypoglycemia/ hyperglycemia. \par Modal day report: pattern. \par Pt with HYPO  1% of the time (0.1% below 54),  93% in target range\par Hyperglycemia: 6 % elevation \par Identified issues: carbohydrate counting\par dates analyzed:10/3/20-12/31/20\par \par *** Sep 21, 2020 ***\par \par on Soliqua 60 units sq daily, metformin er 500 mg 4 tabs with dinner, steglatro 15 mg qd, Humalog 10 un ac D, synthroid 100 mcg, benicar 40mg, crestor 10 mg HS\par off neurontin- "was not working", switched to nabumetone\par reports fbs-  110's, ppg- not checking\par feels well overall. lost 14 lbs since 01/20\par \par \par *** Televisit  Apr 27, 2020 ***\par \par feels well, no new c/o\par on Soliqua 60 units sq daily, metformin er 500 mg 4 tabs with dinner, steglatro 15 mg qd, Fiasp 10 un ac D, synthroid 100 mcg, benicar 40mg, crestor 10 mg HS\par \par Reports Average AM BS  -126, highest 169, lowest 91 ( midday)\par Average PM BS levels - 192, highest 322. lowest 116\par \par a1c - 6.3\par TG- 224, LDL- 70\par \par *** Jan 23, 2020 ***\par \par on Soliqua 60 units sq daily,  metformin er 500 mg 4 tabs with dinner,  steglatro 5 mg qd ,  Fiasp 6 un ac D,  synthroid 100 mcg, benicar 40mg, crestor 10 mg HS\par \par a1c- 6.8, TSH- 0.29, LDL- 73, TG- 239\par \par Log: fbs-  116- 167, ac L- 123-127, ac D- 172- 248\par \par *** Oct 24, 2019 ***\par \par on soliqua 57 un qd,  metformin er 500 mg 4 tabs with dinner, synthroid 100 mcg, benicar 40mg, crestor 10 mg HS\par 24h UFC- 15\par no repeat labs yet\par log: fbs- 110-183, with few episodes of mid 200's (when forgets soliqua), p/D- 183-327\par \par HISTORY OF PRESENT ILLNESS. \par \par Ms. EDWARD was diagnosed with Diabetes Mellitus Type 2 more than 15 years ago\par Reports history HTN, dyslipidemia, obesity, diabetic neuropathy, hypothyroidism. \par She denies CAD,  known complications of retinopathy, nephropathy. \par Presently on januvia 100 mg, metformin 1000 mg bid, armour 60 mg, benicar 40mg, crestor 10 mg HS\par She's not checking her blood sugars at home. States that forgets to take morning dose of metformin frequently.\par She states that her armour is expensive and wants to be switched to L-thyroxine.\par Fingerstick glucose in the office today is 184  mg/dL  (fasting). \par Diet: not following ADA\par Exercise: none\par \par Lab review: a1c- 9.7, ast/alt- 60/63, LDL- 71, TSH- 2.22, neg celiac screening\par \par \par ****\par \par Last dilated eye - 8/20\par Last podiatry visit  - 10/20\par Last cardiology evaluation - about 3 years ago\par Last stress test - about 3 years ago (per patient- normal)\par Last 2-D Echo - about 3 years ago (per patient- normal)\par Last nephrology evaluation - none\par Last neurology evaluation- 2018\par

## 2020-12-31 NOTE — ASSESSMENT
[Carbohydrate Consistent Diet] : carbohydrate consistent diet [Hypoglycemia Management] : hypoglycemia management [Diabetes Foot Care] : diabetes foot care [Long Term Vascular Complications] : long term vascular complications of diabetes [Action and use of Insulin] : action and use of short and long-acting insulin [Self Monitoring of Blood Glucose] : self monitoring of blood glucose [FreeTextEntry1] : 1. DM2, well controlled\par - Soliqua 60 units sq daily (should switch to am)\par - metformin er 500 mg 4 tabs with dinner, steglatro 15 mg qd,  Fiasp 10 un ac D. \par - continue Benicar, Crestor\par \par 2. Hypothyroidism\par - synthroid 100 mcg, might need adjustment next visit\par RTC 3 mos

## 2021-01-04 ENCOUNTER — APPOINTMENT (OUTPATIENT)
Dept: ENDOCRINOLOGY | Facility: CLINIC | Age: 51
End: 2021-01-04
Payer: COMMERCIAL

## 2021-01-04 PROCEDURE — 99072 ADDL SUPL MATRL&STAF TM PHE: CPT

## 2021-01-04 PROCEDURE — G0108 DIAB MANAGE TRN  PER INDIV: CPT

## 2021-02-27 ENCOUNTER — RX RENEWAL (OUTPATIENT)
Age: 51
End: 2021-02-27

## 2021-03-01 ENCOUNTER — NON-APPOINTMENT (OUTPATIENT)
Age: 51
End: 2021-03-01

## 2021-03-01 ENCOUNTER — TRANSCRIPTION ENCOUNTER (OUTPATIENT)
Age: 51
End: 2021-03-01

## 2021-04-05 ENCOUNTER — APPOINTMENT (OUTPATIENT)
Dept: ENDOCRINOLOGY | Facility: CLINIC | Age: 51
End: 2021-04-05
Payer: COMMERCIAL

## 2021-04-05 VITALS
TEMPERATURE: 98.6 F | SYSTOLIC BLOOD PRESSURE: 120 MMHG | BODY MASS INDEX: 35.85 KG/M2 | DIASTOLIC BLOOD PRESSURE: 70 MMHG | WEIGHT: 210 LBS | OXYGEN SATURATION: 98 % | HEART RATE: 107 BPM | HEIGHT: 64 IN | RESPIRATION RATE: 17 BRPM

## 2021-04-05 LAB — GLUCOSE BLDC GLUCOMTR-MCNC: 196

## 2021-04-05 PROCEDURE — 99214 OFFICE O/P EST MOD 30 MIN: CPT | Mod: 25

## 2021-04-05 PROCEDURE — 95251 CONT GLUC MNTR ANALYSIS I&R: CPT

## 2021-04-05 PROCEDURE — 82962 GLUCOSE BLOOD TEST: CPT

## 2021-04-05 PROCEDURE — 99072 ADDL SUPL MATRL&STAF TM PHE: CPT

## 2021-04-05 NOTE — HISTORY OF PRESENT ILLNESS
[FreeTextEntry1] : 49 yo F f/u for multiple issues\par \par *** Apr 05, 2021 ***\par \par feels well, less active b/o the weather, regained some weight. received covid vaccine\par on Soliqua 60 units sq daily, metformin er 500 mg 4 tabs with dinner, steglatro 15 mg qd, Humalog 6-8 un ac D, synthroid 100 mcg, benicar 40mg, crestor 10 mg HS\par \par labs done 2 days ago- still pending\par \par Date of download:  4/5/21\par Diabetes Medications and Dosage: as above\par Indication for CGMS: verify a change in the treatment regimen, identify periods of hypoglycemia/ hyperglycemia. \par Modal day report: pattern. \par Pt with HYPO  1%% of the time (NONE below 54),  94% in target range\par Hyperglycemia:  5% elevation \par Identified issues: carbohydrate counting, occas spikes post dinner and hypos' at night (typically when injects higher amount of humalog)\par dates analyzed: 1/6/21-4/5/21\par \par *** Dec 31, 2020 ***\par \par feels great. lost more weight. walks more\par on Soliqua 60 units sq daily, metformin er 500 mg 4 tabs with dinner, steglatro 15 mg qd, Humalog 10 un ac D, synthroid 100 mcg, benicar 40mg, crestor 10 mg HS\par \par a1c- 5.6, LDL- 80, TSH- 0.53, FT4- 1.4\par \par on Dexcom G6\par \par Date of download:  12/31/20\par Diabetes Medications and Dosage: as above\par Indication for CGMS: verify a change in the treatment regimen, identify periods of hypoglycemia/ hyperglycemia. \par Modal day report: pattern. \par Pt with HYPO  1% of the time (0.1% below 54),  93% in target range\par Hyperglycemia: 6 % elevation \par Identified issues: carbohydrate counting\par dates analyzed:10/3/20-12/31/20\par \par *** Sep 21, 2020 ***\par \par on Soliqua 60 units sq daily, metformin er 500 mg 4 tabs with dinner, steglatro 15 mg qd, Humalog 10 un ac D, synthroid 100 mcg, benicar 40mg, crestor 10 mg HS\par off neurontin- "was not working", switched to nabumetone\par reports fbs-  110's, ppg- not checking\par feels well overall. lost 14 lbs since 01/20\par \par \par *** Televisit  Apr 27, 2020 ***\par \par feels well, no new c/o\par on Soliqua 60 units sq daily, metformin er 500 mg 4 tabs with dinner, steglatro 15 mg qd, Fiasp 10 un ac D, synthroid 100 mcg, benicar 40mg, crestor 10 mg HS\par \par Reports Average AM BS  -126, highest 169, lowest 91 ( midday)\par Average PM BS levels - 192, highest 322. lowest 116\par \par a1c - 6.3\par TG- 224, LDL- 70\par \par *** Jan 23, 2020 ***\par \par on Soliqua 60 units sq daily,  metformin er 500 mg 4 tabs with dinner,  steglatro 5 mg qd ,  Fiasp 6 un ac D,  synthroid 100 mcg, benicar 40mg, crestor 10 mg HS\par \par a1c- 6.8, TSH- 0.29, LDL- 73, TG- 239\par \par Log: fbs-  116- 167, ac L- 123-127, ac D- 172- 248\par \par *** Oct 24, 2019 ***\par \par on soliqua 57 un qd,  metformin er 500 mg 4 tabs with dinner, synthroid 100 mcg, benicar 40mg, crestor 10 mg HS\par 24h UFC- 15\par no repeat labs yet\par log: fbs- 110-183, with few episodes of mid 200's (when forgets soliqua), p/D- 183-327\par \par HISTORY OF PRESENT ILLNESS. \par \par Ms. EDWARD was diagnosed with Diabetes Mellitus Type 2 more than 15 years ago\par Reports history HTN, dyslipidemia, obesity, diabetic neuropathy, hypothyroidism. \par She denies CAD,  known complications of retinopathy, nephropathy. \par Presently on januvia 100 mg, metformin 1000 mg bid, armour 60 mg, benicar 40mg, crestor 10 mg HS\par She's not checking her blood sugars at home. States that forgets to take morning dose of metformin frequently.\par She states that her armour is expensive and wants to be switched to L-thyroxine.\par Fingerstick glucose in the office today is 184  mg/dL  (fasting). \par Diet: not following ADA\par Exercise: none\par \par Lab review: a1c- 9.7, ast/alt- 60/63, LDL- 71, TSH- 2.22, neg celiac screening\par \par \par ****\par \par Last dilated eye - 8/20\par Last podiatry visit  - 10/20\par Last cardiology evaluation - about 3 years ago\par Last stress test - about 3 years ago (per patient- normal)\par Last 2-D Echo - about 3 years ago (per patient- normal)\par Last nephrology evaluation - none\par Last neurology evaluation- 2018\par

## 2021-04-05 NOTE — ASSESSMENT
[Carbohydrate Consistent Diet] : carbohydrate consistent diet [Hypoglycemia Management] : hypoglycemia management [Diabetes Foot Care] : diabetes foot care [Long Term Vascular Complications] : long term vascular complications of diabetes [Action and use of Insulin] : action and use of short and long-acting insulin [Self Monitoring of Blood Glucose] : self monitoring of blood glucose [FreeTextEntry1] : 1. DM2, well controlled\par - Soliqua 60 units sq daily (should switch to am)\par - metformin er 500 mg 4 tabs with dinner, steglatro 15 mg qd, can d/c humalog\par - if still with midnight hypo's - decr soliqua\par - continue Benicar, Crestor\par \par 2. Hypothyroidism\par - synthroid 100 mcg, might need adjustment next visit\par RTC 4 mos

## 2021-04-24 ENCOUNTER — RX RENEWAL (OUTPATIENT)
Age: 51
End: 2021-04-24

## 2021-04-27 ENCOUNTER — TRANSCRIPTION ENCOUNTER (OUTPATIENT)
Age: 51
End: 2021-04-27

## 2021-05-18 ENCOUNTER — APPOINTMENT (OUTPATIENT)
Dept: INTERNAL MEDICINE | Facility: CLINIC | Age: 51
End: 2021-05-18
Payer: COMMERCIAL

## 2021-05-18 VITALS
HEIGHT: 64 IN | OXYGEN SATURATION: 95 % | SYSTOLIC BLOOD PRESSURE: 124 MMHG | HEART RATE: 82 BPM | WEIGHT: 211 LBS | DIASTOLIC BLOOD PRESSURE: 80 MMHG | BODY MASS INDEX: 36.02 KG/M2 | TEMPERATURE: 97.5 F

## 2021-05-18 DIAGNOSIS — Z12.4 ENCOUNTER FOR SCREENING FOR MALIGNANT NEOPLASM OF CERVIX: ICD-10-CM

## 2021-05-18 DIAGNOSIS — Z12.83 ENCOUNTER FOR SCREENING FOR MALIGNANT NEOPLASM OF SKIN: ICD-10-CM

## 2021-05-18 DIAGNOSIS — Z12.11 ENCOUNTER FOR SCREENING FOR MALIGNANT NEOPLASM OF COLON: ICD-10-CM

## 2021-05-18 PROCEDURE — 99396 PREV VISIT EST AGE 40-64: CPT | Mod: 25

## 2021-05-18 PROCEDURE — 99072 ADDL SUPL MATRL&STAF TM PHE: CPT

## 2021-05-18 PROCEDURE — 36415 COLL VENOUS BLD VENIPUNCTURE: CPT

## 2021-05-20 NOTE — HEALTH RISK ASSESSMENT
[Good] : ~his/her~  mood as  good [No] : No [1 or 2 (0 pts)] : 1 or 2 (0 points) [Never (0 pts)] : Never (0 points) [No falls in past year] : Patient reported no falls in the past year [0] : 2) Feeling down, depressed, or hopeless: Not at all (0) [Patient reported mammogram was normal] : Patient reported mammogram was normal [Patient reported colonoscopy was normal] : Patient reported colonoscopy was normal [None] : None [# of Members in Household ___] :  household currently consist of [unfilled] member(s) [Employed] : employed [Single] : single [Reports changes in vision] : Reports changes in vision [Smoke Detector] : smoke detector [Carbon Monoxide Detector] : carbon monoxide detector [Seat Belt] :  uses seat belt [Sunscreen] : uses sunscreen [] : No [de-identified] : none [de-identified] : Endo [Audit-CScore] : 0 [de-identified] : Walking [de-identified] : Low Sugar, low carbs [[id number]] : 0 [HIV test declined] : HIV test declined [Hepatitis C test declined] : Hepatitis C test declined [Change in mental status noted] : No change in mental status noted [Language] : denies difficulty with language [Behavior] : denies difficulty with behavior [Learning/Retaining New Information] : denies difficulty learning/retaining new information [Handling Complex Tasks] : denies difficulty handling complex tasks [Reasoning] : denies difficulty with reasoning [Spatial Ability and Orientation] : denies difficulty with spatial ability and orientation [College] : College [Sexually Active] : not sexually active [High Risk Behavior] : no high risk behavior [Feels Safe at Home] : Feels safe at home [Fully functional (bathing, dressing, toileting, transferring, walking, feeding)] : Fully functional (bathing, dressing, toileting, transferring, walking, feeding) [Fully functional (using the telephone, shopping, preparing meals, housekeeping, doing laundry, using] : Fully functional and needs no help or supervision to perform IADLs (using the telephone, shopping, preparing meals, housekeeping, doing laundry, using transportation, managing medications and managing finances) [Reports changes in hearing] : Reports no changes in hearing [Reports normal functional visual acuity (ie: able to read med bottle)] : Reports normal functional visual acuity [Reports changes in dental health] : Reports no changes in dental health [Guns at Home] : no guns at home [Travel to Developing Areas] : does not  travel to developing areas [TB Exposure] : is not being exposed to tuberculosis [Caregiver Concerns] : does not have caregiver concerns [MammogramDate] : 01/2018 [ColonoscopyDate] : 11/2018 [de-identified] : 2

## 2021-05-20 NOTE — REVIEW OF SYSTEMS
[Negative] : Heme/Lymph [Joint Pain] : joint pain [FreeTextEntry9] : abilio wrist pain comes and goes

## 2021-05-20 NOTE — HISTORY OF PRESENT ILLNESS
[Other: _____] : [unfilled] [FreeTextEntry1] : CPE [de-identified] : 50 yr old presents for above, feels well overall. Stopped Humolog and januvia , Metformin to XR as per Endo Specialist , changed Synthroid to lower dosage also. . Sees Podiatrist yearly, Dr Moy,,, Attends PT for Elliott wrist pain,

## 2021-05-20 NOTE — PHYSICAL EXAM
[No Acute Distress] : no acute distress [Well Nourished] : well nourished [Well Developed] : well developed [Well-Appearing] : well-appearing [Normal Sclera/Conjunctiva] : normal sclera/conjunctiva [PERRL] : pupils equal round and reactive to light [EOMI] : extraocular movements intact [Normal Outer Ear/Nose] : the outer ears and nose were normal in appearance [Normal Oropharynx] : the oropharynx was normal [No JVD] : no jugular venous distention [No Lymphadenopathy] : no lymphadenopathy [Supple] : supple [Thyroid Normal, No Nodules] : the thyroid was normal and there were no nodules present [No Respiratory Distress] : no respiratory distress  [No Accessory Muscle Use] : no accessory muscle use [Clear to Auscultation] : lungs were clear to auscultation bilaterally [Normal Rate] : normal rate  [Regular Rhythm] : with a regular rhythm [Normal S1, S2] : normal S1 and S2 [No Murmur] : no murmur heard [No Carotid Bruits] : no carotid bruits [No Abdominal Bruit] : a ~M bruit was not heard ~T in the abdomen [No Varicosities] : no varicosities [Pedal Pulses Present] : the pedal pulses are present [No Edema] : there was no peripheral edema [No Palpable Aorta] : no palpable aorta [No Extremity Clubbing/Cyanosis] : no extremity clubbing/cyanosis [Normal Appearance] : normal in appearance [No Nipple Discharge] : no nipple discharge [No Axillary Lymphadenopathy] : no axillary lymphadenopathy [Soft] : abdomen soft [Non Tender] : non-tender [Non-distended] : non-distended [No Masses] : no abdominal mass palpated [No HSM] : no HSM [Normal Bowel Sounds] : normal bowel sounds [Declined Rectal Exam] : declined rectal exam [Normal Posterior Cervical Nodes] : no posterior cervical lymphadenopathy [Normal Anterior Cervical Nodes] : no anterior cervical lymphadenopathy [No CVA Tenderness] : no CVA  tenderness [No Spinal Tenderness] : no spinal tenderness [Kyphosis] : no kyphosis [Scoliosis] : no scoliosis [No Joint Swelling] : no joint swelling [Grossly Normal Strength/Tone] : grossly normal strength/tone [No Rash] : no rash [Coordination Grossly Intact] : coordination grossly intact [No Focal Deficits] : no focal deficits [Normal Gait] : normal gait [Deep Tendon Reflexes (DTR)] : deep tendon reflexes were 2+ and symmetric [Speech Grossly Normal] : speech grossly normal [Memory Grossly Normal] : memory grossly normal [Normal Affect] : the affect was normal [Alert and Oriented x3] : oriented to person, place, and time [Normal Mood] : the mood was normal [Normal Insight/Judgement] : insight and judgment were intact [de-identified] : obese in stature  [de-identified] : fair in complexion/ needs full body scanning recommended

## 2021-05-20 NOTE — ASSESSMENT
[FreeTextEntry1] : 50 yr old female CPE\par \par \par Multiple medical conditions, Clinically Stable at this time\par \par  DM- Gaining much better control, F/U Endo, Dietary restrictions, \par meds daily, weight loss/ exercise recommended \par \par Reviewed all Health Screenings recommended / \par \par Labs done in office by MA/ FIT test ordered \par \par Referrals done \par \par F/U 6 months \par \par

## 2021-05-20 NOTE — COUNSELING
[Potential consequences of obesity discussed] : Potential consequences of obesity discussed [Benefits of weight loss discussed] : Benefits of weight loss discussed [Structured Weight Management Program suggested:] : Structured weight management program suggested [Encouraged to maintain food diary] : Encouraged to maintain food diary [Encouraged to increase physical activity] : Encouraged to increase physical activity [Encouraged to use exercise tracking device] : Encouraged to use exercise tracking device [Weigh Self Weekly] : weigh self weekly [Decrease Portions] : decrease portions [____ min/wk Activity] : [unfilled] min/wk activity [Keep Food Diary] : keep food diary [de-identified] : Healthy Lifestyle

## 2021-05-25 LAB
BASOPHILS # BLD AUTO: 0.06 K/UL
BASOPHILS NFR BLD AUTO: 0.7 %
EOSINOPHIL # BLD AUTO: 0.15 K/UL
EOSINOPHIL NFR BLD AUTO: 1.7 %
HCT VFR BLD CALC: 48.5 %
HGB BLD-MCNC: 15.3 G/DL
IMM GRANULOCYTES NFR BLD AUTO: 0.2 %
LYMPHOCYTES # BLD AUTO: 3.39 K/UL
LYMPHOCYTES NFR BLD AUTO: 39.3 %
MAN DIFF?: NORMAL
MCHC RBC-ENTMCNC: 29.8 PG
MCHC RBC-ENTMCNC: 31.5 GM/DL
MCV RBC AUTO: 94.4 FL
MONOCYTES # BLD AUTO: 0.6 K/UL
MONOCYTES NFR BLD AUTO: 7 %
NEUTROPHILS # BLD AUTO: 4.4 K/UL
NEUTROPHILS NFR BLD AUTO: 51.1 %
PLATELET # BLD AUTO: 267 K/UL
RBC # BLD: 5.14 M/UL
RBC # FLD: 14 %
WBC # FLD AUTO: 8.62 K/UL

## 2021-08-09 ENCOUNTER — APPOINTMENT (OUTPATIENT)
Dept: ENDOCRINOLOGY | Facility: CLINIC | Age: 51
End: 2021-08-09
Payer: COMMERCIAL

## 2021-08-09 VITALS
RESPIRATION RATE: 16 BRPM | BODY MASS INDEX: 36.02 KG/M2 | HEIGHT: 64 IN | TEMPERATURE: 98.6 F | HEART RATE: 84 BPM | SYSTOLIC BLOOD PRESSURE: 120 MMHG | DIASTOLIC BLOOD PRESSURE: 64 MMHG | OXYGEN SATURATION: 97 % | WEIGHT: 211 LBS

## 2021-08-09 LAB — GLUCOSE BLDC GLUCOMTR-MCNC: 83

## 2021-08-09 PROCEDURE — 82962 GLUCOSE BLOOD TEST: CPT

## 2021-08-09 PROCEDURE — 95251 CONT GLUC MNTR ANALYSIS I&R: CPT

## 2021-08-09 PROCEDURE — 99214 OFFICE O/P EST MOD 30 MIN: CPT | Mod: 25

## 2021-08-09 NOTE — HISTORY OF PRESENT ILLNESS
[FreeTextEntry1] : 51 yo F f/u for multiple issues\par \par \par *** Aug 09, 2021 ***\par \par on Soliqua 60 units sq daily, metformin er 500 mg 4 tabs with dinner, steglatro 15 mg qd,  synthroid 88 mcg, benicar 40mg, crestor 10 mg HS\par \par Date of download:  8/6/21\par Diabetes Medications and Dosage: as above\par Indication for CGMS: verify a change in the treatment regimen, identify periods of hypoglycemia/ hyperglycemia. \par Modal day report: pattern. \par Pt with HYPO  3% of the time (<1% below 54),  89% in target range\par Hyperglycemia:  7% elevation \par Identified issues: carbohydrate counting\par dates analyzed: 4/21/21-7/19/21\par \par labs from 7/24/21- a1c- 5.6, LDL-80, TSH- 0.87, 25D- 54\par \par *** Apr 05, 2021 ***\par \par feels well, less active b/o the weather, regained some weight. received covid vaccine\par on Soliqua 60 units sq daily, metformin er 500 mg 4 tabs with dinner, steglatro 15 mg qd, Humalog 6-8 un ac D, synthroid 100 mcg, benicar 40mg, crestor 10 mg HS\par \par labs done 2 days ago- still pending\par \par Date of download:  4/5/21\par Diabetes Medications and Dosage: as above\par Indication for CGMS: verify a change in the treatment regimen, identify periods of hypoglycemia/ hyperglycemia. \par Modal day report: pattern. \par Pt with HYPO  1%% of the time (NONE below 54),  94% in target range\par Hyperglycemia:  5% elevation \par Identified issues: carbohydrate counting, occas spikes post dinner and hypos' at night (typically when injects higher amount of humalog)\par dates analyzed: 1/6/21-4/5/21\par \par *** Dec 31, 2020 ***\par \par feels great. lost more weight. walks more\par on Soliqua 60 units sq daily, metformin er 500 mg 4 tabs with dinner, steglatro 15 mg qd, Humalog 10 un ac D, synthroid 100 mcg, benicar 40mg, crestor 10 mg HS\par \par a1c- 5.6, LDL- 80, TSH- 0.53, FT4- 1.4\par \par on Dexcom G6\par \par Date of download:  12/31/20\par Diabetes Medications and Dosage: as above\par Indication for CGMS: verify a change in the treatment regimen, identify periods of hypoglycemia/ hyperglycemia. \par Modal day report: pattern. \par Pt with HYPO  1% of the time (0.1% below 54),  93% in target range\par Hyperglycemia: 6 % elevation \par Identified issues: carbohydrate counting\par dates analyzed:10/3/20-12/31/20\par \par *** Sep 21, 2020 ***\par \par on Soliqua 60 units sq daily, metformin er 500 mg 4 tabs with dinner, steglatro 15 mg qd, Humalog 10 un ac D, synthroid 100 mcg, benicar 40mg, crestor 10 mg HS\par off neurontin- "was not working", switched to nabumetone\par reports fbs-  110's, ppg- not checking\par feels well overall. lost 14 lbs since 01/20\par \par \par *** Televisit  Apr 27, 2020 ***\par \par feels well, no new c/o\par on Soliqua 60 units sq daily, metformin er 500 mg 4 tabs with dinner, steglatro 15 mg qd, Fiasp 10 un ac D, synthroid 100 mcg, benicar 40mg, crestor 10 mg HS\par \par Reports Average AM BS  -126, highest 169, lowest 91 ( midday)\par Average PM BS levels - 192, highest 322. lowest 116\par \par a1c - 6.3\par TG- 224, LDL- 70\par \par *** Jan 23, 2020 ***\par \par on Soliqua 60 units sq daily,  metformin er 500 mg 4 tabs with dinner,  steglatro 5 mg qd ,  Fiasp 6 un ac D,  synthroid 100 mcg, benicar 40mg, crestor 10 mg HS\par \par a1c- 6.8, TSH- 0.29, LDL- 73, TG- 239\par \par Log: fbs-  116- 167, ac L- 123-127, ac D- 172- 248\par \par *** Oct 24, 2019 ***\par \par on soliqua 57 un qd,  metformin er 500 mg 4 tabs with dinner, synthroid 100 mcg, benicar 40mg, crestor 10 mg HS\par 24h UFC- 15\par no repeat labs yet\par log: fbs- 110-183, with few episodes of mid 200's (when forgets soliqua), p/D- 183-327\par \par HISTORY OF PRESENT ILLNESS. \par \par Ms. EDWARD was diagnosed with Diabetes Mellitus Type 2 more than 15 years ago\par Reports history HTN, dyslipidemia, obesity, diabetic neuropathy, hypothyroidism. \par She denies CAD,  known complications of retinopathy, nephropathy. \par Presently on januvia 100 mg, metformin 1000 mg bid, armour 60 mg, benicar 40mg, crestor 10 mg HS\par She's not checking her blood sugars at home. States that forgets to take morning dose of metformin frequently.\par She states that her armour is expensive and wants to be switched to L-thyroxine.\par Fingerstick glucose in the office today is 184  mg/dL  (fasting). \par Diet: not following ADA\par Exercise: none\par \par Lab review: a1c- 9.7, ast/alt- 60/63, LDL- 71, TSH- 2.22, neg celiac screening\par \par \par ****\par \par Last dilated eye - 8/20\par Last podiatry visit  - 10/20\par Last cardiology evaluation - about 3 years ago\par Last stress test - about 3 years ago (per patient- normal)\par Last 2-D Echo - about 3 years ago (per patient- normal)\par Last nephrology evaluation - none\par Last neurology evaluation- 2018\par

## 2021-08-09 NOTE — ASSESSMENT
[Carbohydrate Consistent Diet] : carbohydrate consistent diet [Hypoglycemia Management] : hypoglycemia management [Diabetes Foot Care] : diabetes foot care [Long Term Vascular Complications] : long term vascular complications of diabetes [Action and use of Insulin] : action and use of short and long-acting insulin [Self Monitoring of Blood Glucose] : self monitoring of blood glucose [FreeTextEntry1] : 1. DM2, well controlled\par - can decr Soliqua by 2 un q 3 days and stay around 50-52 units sq daily \par - metformin er 500 mg 4 tabs with dinner, steglatro 15 mg qd\par - continue Benicar, Crestor\par \par 2. Hypothyroidism\par - synthroid 88 mcg\par RTC 4-6 mos

## 2021-11-12 ENCOUNTER — RX RENEWAL (OUTPATIENT)
Age: 51
End: 2021-11-12

## 2021-12-24 ENCOUNTER — RX RENEWAL (OUTPATIENT)
Age: 51
End: 2021-12-24

## 2021-12-24 RX ORDER — BLOOD-GLUCOSE,RECEIVER,CONT
EACH MISCELLANEOUS
Qty: 1 | Refills: 3 | Status: ACTIVE | COMMUNITY
Start: 2020-09-21 | End: 1900-01-01

## 2022-01-12 ENCOUNTER — RX RENEWAL (OUTPATIENT)
Age: 52
End: 2022-01-12

## 2022-02-01 ENCOUNTER — APPOINTMENT (OUTPATIENT)
Dept: INTERNAL MEDICINE | Facility: CLINIC | Age: 52
End: 2022-02-01
Payer: COMMERCIAL

## 2022-02-01 ENCOUNTER — APPOINTMENT (OUTPATIENT)
Dept: ENDOCRINOLOGY | Facility: CLINIC | Age: 52
End: 2022-02-01
Payer: COMMERCIAL

## 2022-02-01 VITALS
HEIGHT: 64 IN | DIASTOLIC BLOOD PRESSURE: 80 MMHG | HEART RATE: 82 BPM | WEIGHT: 212 LBS | BODY MASS INDEX: 36.19 KG/M2 | SYSTOLIC BLOOD PRESSURE: 118 MMHG | TEMPERATURE: 97.3 F | OXYGEN SATURATION: 97 %

## 2022-02-01 VITALS
HEIGHT: 64 IN | DIASTOLIC BLOOD PRESSURE: 60 MMHG | OXYGEN SATURATION: 98 % | SYSTOLIC BLOOD PRESSURE: 122 MMHG | WEIGHT: 212 LBS | RESPIRATION RATE: 17 BRPM | TEMPERATURE: 98.5 F | BODY MASS INDEX: 36.19 KG/M2 | HEART RATE: 88 BPM

## 2022-02-01 LAB — GLUCOSE BLDC GLUCOMTR-MCNC: 92

## 2022-02-01 PROCEDURE — 82962 GLUCOSE BLOOD TEST: CPT

## 2022-02-01 PROCEDURE — 99214 OFFICE O/P EST MOD 30 MIN: CPT

## 2022-02-01 PROCEDURE — 99214 OFFICE O/P EST MOD 30 MIN: CPT | Mod: 25

## 2022-02-01 PROCEDURE — 95251 CONT GLUC MNTR ANALYSIS I&R: CPT

## 2022-02-01 RX ORDER — LEVOTHYROXINE SODIUM 100 UG/1
100 TABLET ORAL
Qty: 90 | Refills: 0 | Status: DISCONTINUED | COMMUNITY
Start: 2020-12-11 | End: 2022-02-01

## 2022-02-01 NOTE — ASSESSMENT
[Carbohydrate Consistent Diet] : carbohydrate consistent diet [Hypoglycemia Management] : hypoglycemia management [Diabetes Foot Care] : diabetes foot care [Long Term Vascular Complications] : long term vascular complications of diabetes [Action and use of Insulin] : action and use of short and long-acting insulin [Self Monitoring of Blood Glucose] : self monitoring of blood glucose [FreeTextEntry1] : 1. DM2, well controlled\par - can decr Soliqua by 2 un q 3 days and stay around 50-52 units sq daily \par - metformin er 500 mg 4 tabs with dinner, steglatro 15 mg qd\par - continue Benicar, Crestor\par - we discussed Plenity to help with weight loss, but she wants to focus on the diet first\par \par 2. Hypothyroidism\par - synthroid 88 mcg\par - TSH today\par RTC 4-6 mos

## 2022-02-01 NOTE — HISTORY OF PRESENT ILLNESS
[FreeTextEntry1] : 52 yo F f/u for multiple issues\par \par *** Feb 01, 2022 ***\par \par feels well, no new c/o\par on Soliqua 57 units sq daily, metformin er 500 mg 4 tabs with dinner, steglatro 15 mg qd,  synthroid 88 mcg, benicar 40mg, crestor 10 mg HS\par wearing dexcom\par Date of download:  2/1/22\par Diabetes Medications and Dosage: as above\par Indication for CGMS: verify a change in the treatment regimen, identify periods of hypoglycemia/ hyperglycemia. \par Modal day report: pattern. \par Pt with HYPO  3% of the time (<1% below 54),  91% in target range\par Hyperglycemia:  6% elevation \par Identified issues: carbohydrate counting\par dates analyzed: 1/19/22-2/1/22\par \par labs from 1/14/22- a1c- 6.1, LDL- 104, creat- 0.74\par \par *** Aug 09, 2021 ***\par \par on Soliqua 60 units sq daily, metformin er 500 mg 4 tabs with dinner, steglatro 15 mg qd,  synthroid 88 mcg, benicar 40mg, crestor 10 mg HS\par \par Date of download:  8/6/21\par Diabetes Medications and Dosage: as above\par Indication for CGMS: verify a change in the treatment regimen, identify periods of hypoglycemia/ hyperglycemia. \par Modal day report: pattern. \par Pt with HYPO  3% of the time (<1% below 54),  89% in target range\par Hyperglycemia:  7% elevation \par Identified issues: carbohydrate counting\par dates analyzed: 4/21/21-7/19/21\par \par labs from 7/24/21- a1c- 5.6, LDL-80, TSH- 0.87, 25D- 54\par \par *** Apr 05, 2021 ***\par \par feels well, less active b/o the weather, regained some weight. received covid vaccine\par on Soliqua 60 units sq daily, metformin er 500 mg 4 tabs with dinner, steglatro 15 mg qd, Humalog 6-8 un ac D, synthroid 100 mcg, benicar 40mg, crestor 10 mg HS\par \par labs done 2 days ago- still pending\par \par Date of download:  4/5/21\par Diabetes Medications and Dosage: as above\par Indication for CGMS: verify a change in the treatment regimen, identify periods of hypoglycemia/ hyperglycemia. \par Modal day report: pattern. \par Pt with HYPO  1%% of the time (NONE below 54),  94% in target range\par Hyperglycemia:  5% elevation \par Identified issues: carbohydrate counting, occas spikes post dinner and hypos' at night (typically when injects higher amount of humalog)\par dates analyzed: 1/6/21-4/5/21\par \par *** Dec 31, 2020 ***\par \par feels great. lost more weight. walks more\par on Soliqua 60 units sq daily, metformin er 500 mg 4 tabs with dinner, steglatro 15 mg qd, Humalog 10 un ac D, synthroid 100 mcg, benicar 40mg, crestor 10 mg HS\par \par a1c- 5.6, LDL- 80, TSH- 0.53, FT4- 1.4\par \par on Dexcom G6\par \par Date of download:  12/31/20\par Diabetes Medications and Dosage: as above\par Indication for CGMS: verify a change in the treatment regimen, identify periods of hypoglycemia/ hyperglycemia. \par Modal day report: pattern. \par Pt with HYPO  1% of the time (0.1% below 54),  93% in target range\par Hyperglycemia: 6 % elevation \par Identified issues: carbohydrate counting\par dates analyzed:10/3/20-12/31/20\par \par *** Sep 21, 2020 ***\par \par on Soliqua 60 units sq daily, metformin er 500 mg 4 tabs with dinner, steglatro 15 mg qd, Humalog 10 un ac D, synthroid 100 mcg, benicar 40mg, crestor 10 mg HS\par off neurontin- "was not working", switched to nabumetone\par reports fbs-  110's, ppg- not checking\par feels well overall. lost 14 lbs since 01/20\par \par \par *** Televisit  Apr 27, 2020 ***\par \par feels well, no new c/o\par on Soliqua 60 units sq daily, metformin er 500 mg 4 tabs with dinner, steglatro 15 mg qd, Fiasp 10 un ac D, synthroid 100 mcg, benicar 40mg, crestor 10 mg HS\par \par Reports Average AM BS  -126, highest 169, lowest 91 ( midday)\par Average PM BS levels - 192, highest 322. lowest 116\par \par a1c - 6.3\par TG- 224, LDL- 70\par \par *** Jan 23, 2020 ***\par \par on Soliqua 60 units sq daily,  metformin er 500 mg 4 tabs with dinner,  steglatro 5 mg qd ,  Fiasp 6 un ac D,  synthroid 100 mcg, benicar 40mg, crestor 10 mg HS\par \par a1c- 6.8, TSH- 0.29, LDL- 73, TG- 239\par \par Log: fbs-  116- 167, ac L- 123-127, ac D- 172- 248\par \par *** Oct 24, 2019 ***\par \par on soliqua 57 un qd,  metformin er 500 mg 4 tabs with dinner, synthroid 100 mcg, benicar 40mg, crestor 10 mg HS\par 24h UFC- 15\par no repeat labs yet\par log: fbs- 110-183, with few episodes of mid 200's (when forgets soliqua), p/D- 183-327\par \par HISTORY OF PRESENT ILLNESS. \par \par Ms. EDWARD was diagnosed with Diabetes Mellitus Type 2 more than 15 years ago\par Reports history HTN, dyslipidemia, obesity, diabetic neuropathy, hypothyroidism. \par She denies CAD,  known complications of retinopathy, nephropathy. \par Presently on januvia 100 mg, metformin 1000 mg bid, armour 60 mg, benicar 40mg, crestor 10 mg HS\par She's not checking her blood sugars at home. States that forgets to take morning dose of metformin frequently.\par She states that her armour is expensive and wants to be switched to L-thyroxine.\par Fingerstick glucose in the office today is 184  mg/dL  (fasting). \par Diet: not following ADA\par Exercise: none\par \par Lab review: a1c- 9.7, ast/alt- 60/63, LDL- 71, TSH- 2.22, neg celiac screening\par \par \par ****\par \par Last dilated eye - 8/20\par Last podiatry visit  - 10/20\par Last cardiology evaluation - about 3 years ago\par Last stress test - about 3 years ago (per patient- normal)\par Last 2-D Echo - about 3 years ago (per patient- normal)\par Last nephrology evaluation - none\par Last neurology evaluation- 2018\par

## 2022-02-02 NOTE — HISTORY OF PRESENT ILLNESS
[FreeTextEntry1] : F/U DM, Obesity , Hyperlipidemia, HTN [de-identified] : 51 yr old presents for above, feels well overall. Presents with labs today  HGB A1C 6.3,, from Endo/ Osteopath/ Reviewed by myself, HGB A1C  DO. 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

## 2022-02-02 NOTE — REVIEW OF SYSTEMS
[Joint Pain] : joint pain [Muscle Pain] : muscle pain [Negative] : Heme/Lymph [FreeTextEntry9] : Left wrist pain

## 2022-02-02 NOTE — HEALTH RISK ASSESSMENT
[Never] : Never [No] : No [No falls in past year] : Patient reported no falls in the past year [de-identified] : none [de-identified] : Endo [de-identified] : tries to walk to train/ [place] [de-identified] : low sweets, low carbs

## 2022-02-02 NOTE — PHYSICAL EXAM
[Normal] : affect was normal and insight and judgment were intact [de-identified] : Obese in stature  [de-identified] : slightly limited ROM Right and left wrist  and Elliott shoulders

## 2022-02-02 NOTE — ASSESSMENT
[FreeTextEntry1] : 51 yr old F/U\par \par \par Multiple medical conditions\par \par Clinically Stable at this time\par \par DM- Much better control, F/U Endo\par weight loss, dietary restricytions\par Exercise \par \par Meds renewed/ Labs reviewed\par \par Re-ordered FIT test \par \par F/U 4-6 months CPE

## 2022-02-02 NOTE — HEALTH RISK ASSESSMENT
[Never] : Never [No] : No [No falls in past year] : Patient reported no falls in the past year [de-identified] : none [de-identified] : Endo [de-identified] : tries to walk to train/ [place] [de-identified] : low sweets, low carbs

## 2022-02-02 NOTE — PHYSICAL EXAM
[Normal] : affect was normal and insight and judgment were intact [de-identified] : Obese in stature  [de-identified] : slightly limited ROM Right and left wrist  and Elliott shoulders

## 2022-02-02 NOTE — HISTORY OF PRESENT ILLNESS
[FreeTextEntry1] : F/U DM, Obesity , Hyperlipidemia, HTN [de-identified] : 51 yr old presents for above, feels well overall. Presents with labs today  HGB A1C 6.3,, from Endo/ Osteopath/ Reviewed by myself, HGB A1C  DO. 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

## 2022-04-26 ENCOUNTER — RX RENEWAL (OUTPATIENT)
Age: 52
End: 2022-04-26

## 2022-06-21 ENCOUNTER — APPOINTMENT (OUTPATIENT)
Dept: ENDOCRINOLOGY | Facility: CLINIC | Age: 52
End: 2022-06-21

## 2022-07-02 ENCOUNTER — TRANSCRIPTION ENCOUNTER (OUTPATIENT)
Age: 52
End: 2022-07-02

## 2022-07-02 ENCOUNTER — APPOINTMENT (OUTPATIENT)
Dept: AFTER HOURS CARE | Facility: EMERGENCY ROOM | Age: 52
End: 2022-07-02

## 2022-07-02 PROCEDURE — 99203 OFFICE O/P NEW LOW 30 MIN: CPT | Mod: 95

## 2022-07-02 NOTE — HISTORY OF PRESENT ILLNESS
[Home] : at home, [unfilled] , at the time of the visit. [Other Location: e.g. Home (Enter Location, City,State)___] : at [unfilled] [Mother] : mother [Verbal consent obtained from patient] : the patient, [unfilled] [FreeTextEntry8] : 50 yo F with hx of DM, HTN, HLD and obesity vaccinated and boosted c/o testing positive for COVID today with s/s including sore throat, low grade fever, runny nose, h/a and malaise.  Pt requesting MAB.  No CP, SOB, abd pain, N/V or diarrhea

## 2022-07-02 NOTE — PHYSICAL EXAM
[No Acute Distress] : no acute distress [Well Nourished] : well nourished [Well Developed] : well developed [Well-Appearing] : well-appearing [Normal Sclera/Conjunctiva] : normal sclera/conjunctiva [No Respiratory Distress] : no respiratory distress  [No Accessory Muscle Use] : no accessory muscle use [No Rash] : no rash [Coordination Grossly Intact] : coordination grossly intact [Normal Affect] : the affect was normal [No Focal Deficits] : no focal deficits [Normal Insight/Judgement] : insight and judgment were intact

## 2022-07-02 NOTE — REVIEW OF SYSTEMS
[Fever] : fever [Fatigue] : fatigue [Nasal Discharge] : nasal discharge [Sore Throat] : sore throat [Cough] : cough [Headache] : headache [Vision Problems] : no vision problems [Chest Pain] : no chest pain [Shortness Of Breath] : no shortness of breath [Wheezing] : no wheezing [Dyspnea on Exertion] : no dyspnea on exertion [Abdominal Pain] : no abdominal pain [Diarrhea] : diarrhea [Vomiting] : no vomiting [Dysuria] : no dysuria [Joint Pain] : no joint pain [Skin Rash] : no skin rash

## 2022-07-02 NOTE — PLAN
[FreeTextEntry1] : Schedule for MAB infusion Supportive and symptomatic care at this time  Instructions and precautions reviewed

## 2022-07-05 ENCOUNTER — OUTPATIENT (OUTPATIENT)
Dept: OUTPATIENT SERVICES | Facility: HOSPITAL | Age: 52
LOS: 1 days | End: 2022-07-05

## 2022-07-05 ENCOUNTER — APPOINTMENT (OUTPATIENT)
Dept: DISASTER EMERGENCY | Facility: HOSPITAL | Age: 52
End: 2022-07-05

## 2022-07-05 VITALS
HEIGHT: 64 IN | WEIGHT: 212.08 LBS | OXYGEN SATURATION: 96 % | TEMPERATURE: 98 F | DIASTOLIC BLOOD PRESSURE: 70 MMHG | SYSTOLIC BLOOD PRESSURE: 103 MMHG | HEART RATE: 91 BPM | RESPIRATION RATE: 19 BRPM

## 2022-07-05 VITALS
HEART RATE: 96 BPM | TEMPERATURE: 98 F | DIASTOLIC BLOOD PRESSURE: 69 MMHG | OXYGEN SATURATION: 97 % | SYSTOLIC BLOOD PRESSURE: 104 MMHG | RESPIRATION RATE: 18 BRPM

## 2022-07-05 DIAGNOSIS — U07.1 COVID-19: ICD-10-CM

## 2022-07-05 RX ORDER — BEBTELOVIMAB 87.5 MG/ML
175 INJECTION, SOLUTION INTRAVENOUS ONCE
Refills: 0 | Status: COMPLETED | OUTPATIENT
Start: 2022-07-05 | End: 2022-07-05

## 2022-07-05 RX ADMIN — BEBTELOVIMAB 175 MILLIGRAM(S): 87.5 INJECTION, SOLUTION INTRAVENOUS at 15:02

## 2022-07-05 NOTE — MONOCLONAL ANTIBODY INFUSION - ASSESSMENT AND PLAN
This is a 51 yrs old female patient with PMHx of DM, HTN, HLD and recently diagnosed with Covid-19 infection who was referred for monoclonal antibody infusion by provider via [facility] after testing positive for COVID 19 on 7/2/22.  Patient states he has been experiencing fever, achy, sore throat, malaise, and nasal congestion since 7/2/22. He denies any CP, SOB, chills, numbness/tingling in b/l limbs, loss of sensation or motor function, N/V/D. Pt is vaccinated and boosted with pfizer x 3.        PLAN:  - Injection procedure explained to patient   - Consent for monoclonal antibody injection obtained   - Risk & benefits discussed/all questions answered  - Inject Bebtelovimab 175 mg over 1 minute.  - Observe patient for one hour post administration    I have reviewed the Bebtelovimab Emergency Use Authorization (EUA) and I have provided the patient or patient's caregiver with the following information:    1. FDA has authorized emergency use Bebtelovimab, which is not an FDA-approved biological product.  2. The patient or patient's caregiver has the option to accept or refuse administration of Bebtelovimab.  3. The significant known and potential risks and benefits of Bebtelovimab and the extent to which such risks and benefits are unknown.  4. Information on available alternative treatments and risks and benefits of those alternatives.    The patient's COVID monoclonal antibody injection administration went well without any complications. The patient tolerated the treatment without any reactions. Vitals were stable throughout the injection & post-injection administration. The pt denies any CP, fevers, chills, SOB, numbness/tingling in b/l limbs, loss of sensation or motor function, N/V/D while receiving the injection. Patient denies any symptoms an hour after post injection. Vitals were taken post injection and were stable. Pt is medically stable to be discharged home. Discharge instructions were provided to the patient with a fact sheet included. Patient was instructed to self-isolate and use infection control measures (e.g wear mask, isolate, social distance, avoid sharing personal items, clean and disinfect "high touch" surfaces, and frequent handwashing according to the CDC guidelines. The patient was informed on what symptoms to be aware of for the next couple of days, and if there are any issues to call the 24/7 clinical call center. Patient was instructed to follow up with PCP as needed.

## 2022-07-05 NOTE — MONOCLONAL ANTIBODY INFUSION - EXAM
CC: Monoclonal Antibody Infusion/COVID 19 Positive  51yFemale    exam/findings:  T(C): --  HR: --  BP: --  RR: --  SpO2: --      PE:   Appearance: NAD	  HEENT:   Normal oral mucosa,   Lymphatic: No lymphadenopathy  Cardiovascular: Normal S1 S2, No JVD, No murmurs, No edema  Respiratory: Lungs clear to auscultation	  Gastrointestinal:  Soft, Non-tender, + BS	  Skin: warm and dry  Neurologic: Non-focal  Extremities: Normal range of motion,

## 2022-07-18 ENCOUNTER — RX RENEWAL (OUTPATIENT)
Age: 52
End: 2022-07-18

## 2022-08-31 ENCOUNTER — APPOINTMENT (OUTPATIENT)
Dept: ENDOCRINOLOGY | Facility: CLINIC | Age: 52
End: 2022-08-31

## 2022-08-31 VITALS
SYSTOLIC BLOOD PRESSURE: 118 MMHG | HEIGHT: 64 IN | RESPIRATION RATE: 17 BRPM | OXYGEN SATURATION: 99 % | DIASTOLIC BLOOD PRESSURE: 80 MMHG | WEIGHT: 207 LBS | BODY MASS INDEX: 35.34 KG/M2 | TEMPERATURE: 98 F | HEART RATE: 84 BPM

## 2022-08-31 LAB — GLUCOSE BLDC GLUCOMTR-MCNC: 111

## 2022-08-31 PROCEDURE — 99214 OFFICE O/P EST MOD 30 MIN: CPT | Mod: 25

## 2022-08-31 PROCEDURE — 95251 CONT GLUC MNTR ANALYSIS I&R: CPT

## 2022-08-31 PROCEDURE — 82962 GLUCOSE BLOOD TEST: CPT

## 2022-08-31 RX ORDER — PEN NEEDLE, DIABETIC 32 GX 1/6"
32G X 4 MM NEEDLE, DISPOSABLE MISCELLANEOUS
Qty: 100 | Refills: 3 | Status: ACTIVE | COMMUNITY
Start: 2022-08-31 | End: 1900-01-01

## 2022-08-31 NOTE — HISTORY OF PRESENT ILLNESS
[FreeTextEntry1] : 50 yo F f/u for multiple issues\par \par *** Aug 31, 2022 ***\par \par feels fine\par  self decreased Soliqua to 35 un qd, metformin er 500 mg 4 tabs with dinner, steglatro 15 mg qd,  synthroid 88 mcg, benicar 40mg, crestor 10 mg HS\par wearing gris\par Date of download:  08/31/2022 \par Diabetes Medications and Dosage: as above\par Indication for CGMS: verify a change in the treatment regimen, identify periods of hypoglycemia/ hyperglycemia. \par Modal day report: pattern. \par Pt with HYPO  0% of the time ( NONE below 54),  78% in target range\par Hyperglycemia:  22% elevation \par Identified issues: carbohydrate counting\par dates analyzed:  08/18/2022- 08/31/2022\par \par labs from 8/16/22- a1c- 6.8%, TSH- 0.439, LDL- 72\par \par *** Feb 01, 2022 ***\par \par feels well, no new c/o\par on Soliqua 57 units sq daily, metformin er 500 mg 4 tabs with dinner, steglatro 15 mg qd,  synthroid 88 mcg, benicar 40mg, crestor 10 mg HS\par wearing dexcom\par Date of download:  2/1/22\par Diabetes Medications and Dosage: as above\par Indication for CGMS: verify a change in the treatment regimen, identify periods of hypoglycemia/ hyperglycemia. \par Modal day report: pattern. \par Pt with HYPO  3% of the time (<1% below 54),  91% in target range\par Hyperglycemia:  6% elevation \par Identified issues: carbohydrate counting\par dates analyzed: 1/19/22-2/1/22\par \par labs from 1/14/22- a1c- 6.1, LDL- 104, creat- 0.74\par \par *** Aug 09, 2021 ***\par \par on Soliqua 60 units sq daily, metformin er 500 mg 4 tabs with dinner, steglatro 15 mg qd,  synthroid 88 mcg, benicar 40mg, crestor 10 mg HS\par \par Date of download:  8/6/21\par Diabetes Medications and Dosage: as above\par Indication for CGMS: verify a change in the treatment regimen, identify periods of hypoglycemia/ hyperglycemia. \par Modal day report: pattern. \par Pt with HYPO  3% of the time (<1% below 54),  89% in target range\par Hyperglycemia:  7% elevation \par Identified issues: carbohydrate counting\par dates analyzed: 4/21/21-7/19/21\par \par labs from 7/24/21- a1c- 5.6, LDL-80, TSH- 0.87, 25D- 54\par \par *** Apr 05, 2021 ***\par \par feels well, less active b/o the weather, regained some weight. received covid vaccine\par on Soliqua 60 units sq daily, metformin er 500 mg 4 tabs with dinner, steglatro 15 mg qd, Humalog 6-8 un ac D, synthroid 100 mcg, benicar 40mg, crestor 10 mg HS\par \par labs done 2 days ago- still pending\par \par Date of download:  4/5/21\par Diabetes Medications and Dosage: as above\par Indication for CGMS: verify a change in the treatment regimen, identify periods of hypoglycemia/ hyperglycemia. \par Modal day report: pattern. \par Pt with HYPO  1%% of the time (NONE below 54),  94% in target range\par Hyperglycemia:  5% elevation \par Identified issues: carbohydrate counting, occas spikes post dinner and hypos' at night (typically when injects higher amount of humalog)\par dates analyzed: 1/6/21-4/5/21\par \par *** Dec 31, 2020 ***\par \par feels great. lost more weight. walks more\par on Soliqua 60 units sq daily, metformin er 500 mg 4 tabs with dinner, steglatro 15 mg qd, Humalog 10 un ac D, synthroid 100 mcg, benicar 40mg, crestor 10 mg HS\par \par a1c- 5.6, LDL- 80, TSH- 0.53, FT4- 1.4\par \par on Dexcom G6\par \par Date of download:  12/31/20\par Diabetes Medications and Dosage: as above\par Indication for CGMS: verify a change in the treatment regimen, identify periods of hypoglycemia/ hyperglycemia. \par Modal day report: pattern. \par Pt with HYPO  1% of the time (0.1% below 54),  93% in target range\par Hyperglycemia: 6 % elevation \par Identified issues: carbohydrate counting\par dates analyzed:10/3/20-12/31/20\par \par *** Sep 21, 2020 ***\par \par on Soliqua 60 units sq daily, metformin er 500 mg 4 tabs with dinner, steglatro 15 mg qd, Humalog 10 un ac D, synthroid 100 mcg, benicar 40mg, crestor 10 mg HS\par off neurontin- "was not working", switched to nabumetone\par reports fbs-  110's, ppg- not checking\par feels well overall. lost 14 lbs since 01/20\par \par \par *** Televisit  Apr 27, 2020 ***\par \par feels well, no new c/o\par on Soliqua 60 units sq daily, metformin er 500 mg 4 tabs with dinner, steglatro 15 mg qd, Fiasp 10 un ac D, synthroid 100 mcg, benicar 40mg, crestor 10 mg HS\par \par Reports Average AM BS  -126, highest 169, lowest 91 ( midday)\par Average PM BS levels - 192, highest 322. lowest 116\par \par a1c - 6.3\par TG- 224, LDL- 70\par \par *** Jan 23, 2020 ***\par \par on Soliqua 60 units sq daily,  metformin er 500 mg 4 tabs with dinner,  steglatro 5 mg qd ,  Fiasp 6 un ac D,  synthroid 100 mcg, benicar 40mg, crestor 10 mg HS\par \par a1c- 6.8, TSH- 0.29, LDL- 73, TG- 239\par \par Log: fbs-  116- 167, ac L- 123-127, ac D- 172- 248\par \par *** Oct 24, 2019 ***\par \par on soliqua 57 un qd,  metformin er 500 mg 4 tabs with dinner, synthroid 100 mcg, benicar 40mg, crestor 10 mg HS\par 24h UFC- 15\par no repeat labs yet\par log: fbs- 110-183, with few episodes of mid 200's (when forgets soliqua), p/D- 183-327\par \par HISTORY OF PRESENT ILLNESS. \par \par Ms. EDWARD was diagnosed with Diabetes Mellitus Type 2 more than 15 years ago\par Reports history HTN, dyslipidemia, obesity, diabetic neuropathy, hypothyroidism. \par She denies CAD,  known complications of retinopathy, nephropathy. \par Presently on januvia 100 mg, metformin 1000 mg bid, armour 60 mg, benicar 40mg, crestor 10 mg HS\par She's not checking her blood sugars at home. States that forgets to take morning dose of metformin frequently.\par She states that her armour is expensive and wants to be switched to L-thyroxine.\par Fingerstick glucose in the office today is 184  mg/dL  (fasting). \par Diet: not following ADA\par Exercise: none\par \par Lab review: a1c- 9.7, ast/alt- 60/63, LDL- 71, TSH- 2.22, neg celiac screening\par \par \par ****\par \par Last dilated eye - 8/22\par Last podiatry visit  - 2021\par Last cardiology evaluation - about 3 years ago\par Last stress test - about 3 years ago (per patient- normal)\par Last 2-D Echo - about 3 years ago (per patient- normal)\par Last nephrology evaluation - none\par Last neurology evaluation- 2018\par

## 2022-08-31 NOTE — ASSESSMENT
[Carbohydrate Consistent Diet] : carbohydrate consistent diet [Hypoglycemia Management] : hypoglycemia management [Diabetes Foot Care] : diabetes foot care [Long Term Vascular Complications] : long term vascular complications of diabetes [Action and use of Insulin] : action and use of short and long-acting insulin [Self Monitoring of Blood Glucose] : self monitoring of blood glucose [FreeTextEntry1] : 1. DM2, suboptimally controlled\par - d/c Soliqua\par - Tresiba 40 un HS, Mounjaro 2.5 mg qw and uptitrate\par - metformin er 500 mg 4 tabs with dinner, steglatro 15 mg qd\par - continue Benicar, Crestor\par - we discussed Plenity to help with weight loss, but she wants to focus on the diet first\par \par 2. Hypothyroidism\par - synthroid 88 mcg\par - monitor TSH \par RTC 4-6 mos

## 2022-10-17 ENCOUNTER — RX RENEWAL (OUTPATIENT)
Age: 52
End: 2022-10-17

## 2022-12-19 ENCOUNTER — NON-APPOINTMENT (OUTPATIENT)
Age: 52
End: 2022-12-19

## 2022-12-19 ENCOUNTER — APPOINTMENT (OUTPATIENT)
Dept: INTERNAL MEDICINE | Facility: CLINIC | Age: 52
End: 2022-12-19

## 2022-12-19 VITALS
DIASTOLIC BLOOD PRESSURE: 80 MMHG | RESPIRATION RATE: 17 BRPM | HEART RATE: 95 BPM | SYSTOLIC BLOOD PRESSURE: 156 MMHG | OXYGEN SATURATION: 98 % | TEMPERATURE: 98.1 F | BODY MASS INDEX: 35.17 KG/M2 | WEIGHT: 206 LBS | HEIGHT: 64 IN

## 2022-12-19 DIAGNOSIS — L72.0 EPIDERMAL CYST: ICD-10-CM

## 2022-12-19 DIAGNOSIS — Z23 ENCOUNTER FOR IMMUNIZATION: ICD-10-CM

## 2022-12-19 DIAGNOSIS — E11.40 TYPE 2 DIABETES MELLITUS WITH DIABETIC NEUROPATHY, UNSPECIFIED: ICD-10-CM

## 2022-12-19 DIAGNOSIS — Z00.00 ENCOUNTER FOR GENERAL ADULT MEDICAL EXAMINATION W/OUT ABNORMAL FINDINGS: ICD-10-CM

## 2022-12-19 PROCEDURE — 99214 OFFICE O/P EST MOD 30 MIN: CPT | Mod: 25

## 2022-12-19 PROCEDURE — 99396 PREV VISIT EST AGE 40-64: CPT | Mod: 25

## 2022-12-19 PROCEDURE — 93000 ELECTROCARDIOGRAM COMPLETE: CPT

## 2022-12-19 RX ORDER — DIFLUNISAL 500 MG/1
500 TABLET, FILM COATED ORAL
Refills: 0 | Status: DISCONTINUED | COMMUNITY
Start: 2022-02-01 | End: 2022-12-19

## 2022-12-19 RX ORDER — COVID-19 ANTIGEN TEST
KIT MISCELLANEOUS
Qty: 8 | Refills: 0 | Status: DISCONTINUED | COMMUNITY
Start: 2022-11-22

## 2022-12-19 RX ORDER — HYDROCORTISONE 25 MG/G
2.5 OINTMENT TOPICAL
Qty: 1 | Refills: 1 | Status: DISCONTINUED | COMMUNITY
Start: 2019-11-27 | End: 2022-12-19

## 2022-12-19 RX ORDER — INDOMETHACIN 50 MG/1
50 CAPSULE ORAL
Qty: 270 | Refills: 0 | Status: ACTIVE | COMMUNITY
Start: 2022-08-04

## 2022-12-19 RX ORDER — FLUOCINONIDE 0.5 MG/ML
0.05 SOLUTION TOPICAL
Qty: 1 | Refills: 5 | Status: DISCONTINUED | COMMUNITY
Start: 2019-11-27 | End: 2022-12-19

## 2022-12-19 RX ORDER — CLINDAMYCIN PHOSPHATE 10 MG/ML
1 LOTION TOPICAL
Qty: 1 | Refills: 10 | Status: DISCONTINUED | COMMUNITY
Start: 2019-11-27 | End: 2022-12-19

## 2022-12-19 RX ORDER — DICLOFENAC SODIUM 1% 10 MG/G
1 GEL TOPICAL
Qty: 200 | Refills: 0 | Status: ACTIVE | COMMUNITY
Start: 2022-09-30

## 2022-12-19 RX ORDER — MINOCYCLINE HYDROCHLORIDE 1 MG/1
1 POWDER ORAL
Qty: 30 | Refills: 0 | Status: DISCONTINUED | COMMUNITY
Start: 2018-08-17 | End: 2022-12-19

## 2022-12-21 ENCOUNTER — NON-APPOINTMENT (OUTPATIENT)
Age: 52
End: 2022-12-21

## 2022-12-21 PROBLEM — Z00.00 ENCOUNTER FOR PREVENTIVE HEALTH EXAMINATION: Status: ACTIVE | Noted: 2018-08-31

## 2022-12-21 PROBLEM — L72.0 EPIDERMAL CYST: Status: ACTIVE | Noted: 2022-12-21

## 2022-12-21 PROBLEM — E11.40 DIABETIC NEUROPATHY: Status: ACTIVE | Noted: 2018-09-29

## 2022-12-21 PROBLEM — Z23 ENCOUNTER FOR IMMUNIZATION: Status: ACTIVE | Noted: 2022-12-21

## 2022-12-21 NOTE — ASSESSMENT
[FreeTextEntry1] : DM2- sees endo, uses dexcom, metformin, steglatro, tresiba, chk labs\par DPN- mild, no meds at this time\par HTN- stable, cont olmesartan, low Na diet\par HLD- stable, chk labs, cont crestor\par Hypothyroid- stable, chk labs, cont synthroid\par Psoriasis- derm f/u, topicals\par Obesity- discussed low calorie diet and exercise as part of a weight loss plan.\par epidermal Cyst- reassured pt. can f/u with derm. \par Discussed healthy lifestyle,discussed and updated vaccinations, healthy diet, exercise, chk labs, discussed appropriate screening tests including colonoscopy, mammo, bone density and pap.\par Discussed the importance of screening for colon cancer. Reviewed screening reccomendations including colonoscopy, Cologuard and Fit DNA testing. I strongly encouraged colonoscopy as that is the best screening test to detect both colon cancer and polyps and is the gold standard.\par Discussed the importance and benefit of a healthy lifestyle including a heart healthy diet such as the Mediterranean diet and regular exercise to try to lose weight as well as 7 hours of sleep nightly.\par see optho yearly\par chk feet nightly\par take all meds as prescribed\par aware of complications of diabetes including but not limited to retinopathy,neuropathy, cvd, esrd,cva,amputations. \par aware of the importance of diet and exercise and maintaining an adequate weight\par chk labs\par

## 2022-12-21 NOTE — PHYSICAL EXAM
[No Acute Distress] : no acute distress [Well Nourished] : well nourished [Well Developed] : well developed [Well-Appearing] : well-appearing [Normal Sclera/Conjunctiva] : normal sclera/conjunctiva [PERRL] : pupils equal round and reactive to light [EOMI] : extraocular movements intact [Normal Outer Ear/Nose] : the outer ears and nose were normal in appearance [Normal Oropharynx] : the oropharynx was normal [No JVD] : no jugular venous distention [No Lymphadenopathy] : no lymphadenopathy [Supple] : supple [Thyroid Normal, No Nodules] : the thyroid was normal and there were no nodules present [No Respiratory Distress] : no respiratory distress  [No Accessory Muscle Use] : no accessory muscle use [Clear to Auscultation] : lungs were clear to auscultation bilaterally [Normal Rate] : normal rate  [Regular Rhythm] : with a regular rhythm [Normal S1, S2] : normal S1 and S2 [No Murmur] : no murmur heard [No Carotid Bruits] : no carotid bruits [No Abdominal Bruit] : a ~M bruit was not heard ~T in the abdomen [No Varicosities] : no varicosities [Pedal Pulses Present] : the pedal pulses are present [No Edema] : there was no peripheral edema [No Palpable Aorta] : no palpable aorta [No Extremity Clubbing/Cyanosis] : no extremity clubbing/cyanosis [Normal Appearance] : normal in appearance [No Nipple Discharge] : no nipple discharge [No Axillary Lymphadenopathy] : no axillary lymphadenopathy [Soft] : abdomen soft [Non Tender] : non-tender [Non-distended] : non-distended [No Masses] : no abdominal mass palpated [No HSM] : no HSM [Normal Bowel Sounds] : normal bowel sounds [Normal Posterior Cervical Nodes] : no posterior cervical lymphadenopathy [Normal Anterior Cervical Nodes] : no anterior cervical lymphadenopathy [No CVA Tenderness] : no CVA  tenderness [No Spinal Tenderness] : no spinal tenderness [No Joint Swelling] : no joint swelling [Grossly Normal Strength/Tone] : grossly normal strength/tone [No Rash] : no rash [Coordination Grossly Intact] : coordination grossly intact [No Focal Deficits] : no focal deficits [Normal Gait] : normal gait [Deep Tendon Reflexes (DTR)] : deep tendon reflexes were 2+ and symmetric [Normal Affect] : the affect was normal [Normal Insight/Judgement] : insight and judgment were intact [de-identified] : left inguinal area- subcut epidermal cyst. 1cm

## 2022-12-21 NOTE — HEALTH RISK ASSESSMENT
[Good] : ~his/her~  mood as  good [Never] : Never [No] : In the past 12 months have you used drugs other than those required for medical reasons? No [No falls in past year] : Patient reported no falls in the past year [0] : 2) Feeling down, depressed, or hopeless: Not at all (0) [PHQ-2 Negative - No further assessment needed] : PHQ-2 Negative - No further assessment needed [Patient reported mammogram was normal] : Patient reported mammogram was normal [None] : None [With Family] : lives with family [Employed] : employed [Single] : single [Fully functional (bathing, dressing, toileting, transferring, walking, feeding)] : Fully functional (bathing, dressing, toileting, transferring, walking, feeding) [Fully functional (using the telephone, shopping, preparing meals, housekeeping, doing laundry, using] : Fully functional and needs no help or supervision to perform IADLs (using the telephone, shopping, preparing meals, housekeeping, doing laundry, using transportation, managing medications and managing finances) [Audit-CScore] : 0 [de-identified] : walks [de-identified] : somewhat healthy [[id number]] : 0 [Change in mental status noted] : No change in mental status noted [Language] : denies difficulty with language [Behavior] : denies difficulty with behavior [Handling Complex Tasks] : denies difficulty handling complex tasks [Reasoning] : denies difficulty with reasoning [Reports changes in hearing] : Reports no changes in hearing [Reports changes in vision] : Reports no changes in vision [Reports changes in dental health] : Reports no changes in dental health [MammogramDate] : 2017 [AdvancecareDate] : 12/19/22

## 2022-12-21 NOTE — HISTORY OF PRESENT ILLNESS
[FreeTextEntry1] : darlene [de-identified] : flonase, zyrtec\par b12 qmonth\par hand arthritis. \par flu vac done\par left lower abd bump. \par post scalp- psoriasis? seeing derm. \par saw ent today.\par dpn. \par

## 2023-01-05 ENCOUNTER — APPOINTMENT (OUTPATIENT)
Dept: ENDOCRINOLOGY | Facility: CLINIC | Age: 53
End: 2023-01-05
Payer: COMMERCIAL

## 2023-01-05 VITALS
BODY MASS INDEX: 34.83 KG/M2 | WEIGHT: 204 LBS | RESPIRATION RATE: 16 BRPM | DIASTOLIC BLOOD PRESSURE: 72 MMHG | SYSTOLIC BLOOD PRESSURE: 118 MMHG | OXYGEN SATURATION: 98 % | TEMPERATURE: 97.3 F | HEIGHT: 64 IN | HEART RATE: 78 BPM

## 2023-01-05 LAB — GLUCOSE BLDC GLUCOMTR-MCNC: 149

## 2023-01-05 PROCEDURE — 95251 CONT GLUC MNTR ANALYSIS I&R: CPT

## 2023-01-05 PROCEDURE — 82962 GLUCOSE BLOOD TEST: CPT

## 2023-01-05 PROCEDURE — 99214 OFFICE O/P EST MOD 30 MIN: CPT | Mod: 25

## 2023-01-05 NOTE — HISTORY OF PRESENT ILLNESS
[FreeTextEntry1] : 53 yo F f/u for multiple issues\par \par *** Jan 05, 2023 ***\par \par stopped Mounjaro after 1 month due to nausea/ GI distress\par had issues with receiving Tresiba, did not resume Soliqua. diet has been worse within the past 2 months\par was also off Crestor for sometime\par taking  metformin er 500 mg 4 tabs with dinner, steglatro 15 mg qd only\par \par Date of download:  01/05/2023 \par Diabetes Medications and Dosage: as above\par Indication for CGMS: verify a change in the treatment regimen, identify periods of hypoglycemia/ hyperglycemia. \par Modal day report: pattern. \par Pt with HYPO  0% of the time ( NONE below 54),  25% in target range\par Hyperglycemia:  75% elevation \par Identified issues: carbohydrate counting\par dates analyzed:  12/23/2022- 01/05/2023\par \par labs from 12/23/22- a1c- 6.3, TG- 363, LDL- 166, TC- 281, TSH- 0.53\par \par *** Aug 31, 2022 ***\par \par feels fine\par  self decreased Soliqua to 35 un qd, metformin er 500 mg 4 tabs with dinner, steglatro 15 mg qd,  synthroid 88 mcg, benicar 40mg, crestor 10 mg HS\par wearing gris\par Date of download:  08/31/2022 \par Diabetes Medications and Dosage: as above\par Indication for CGMS: verify a change in the treatment regimen, identify periods of hypoglycemia/ hyperglycemia. \par Modal day report: pattern. \par Pt with HYPO  0% of the time ( NONE below 54),  78% in target range\par Hyperglycemia:  22% elevation \par Identified issues: carbohydrate counting\par dates analyzed:  08/18/2022- 08/31/2022\par \par labs from 8/16/22- a1c- 6.8%, TSH- 0.439, LDL- 72\par \par *** Feb 01, 2022 ***\par \par feels well, no new c/o\par on Soliqua 57 units sq daily, metformin er 500 mg 4 tabs with dinner, steglatro 15 mg qd,  synthroid 88 mcg, benicar 40mg, crestor 10 mg HS\par wearing dexcom\par Date of download:  2/1/22\par Diabetes Medications and Dosage: as above\par Indication for CGMS: verify a change in the treatment regimen, identify periods of hypoglycemia/ hyperglycemia. \par Modal day report: pattern. \par Pt with HYPO  3% of the time (<1% below 54),  91% in target range\par Hyperglycemia:  6% elevation \par Identified issues: carbohydrate counting\par dates analyzed: 1/19/22-2/1/22\par \par labs from 1/14/22- a1c- 6.1, LDL- 104, creat- 0.74\par \par *** Aug 09, 2021 ***\par \par on Soliqua 60 units sq daily, metformin er 500 mg 4 tabs with dinner, steglatro 15 mg qd,  synthroid 88 mcg, benicar 40mg, crestor 10 mg HS\par \par Date of download:  8/6/21\par Diabetes Medications and Dosage: as above\par Indication for CGMS: verify a change in the treatment regimen, identify periods of hypoglycemia/ hyperglycemia. \par Modal day report: pattern. \par Pt with HYPO  3% of the time (<1% below 54),  89% in target range\par Hyperglycemia:  7% elevation \par Identified issues: carbohydrate counting\par dates analyzed: 4/21/21-7/19/21\par \par labs from 7/24/21- a1c- 5.6, LDL-80, TSH- 0.87, 25D- 54\par \par *** Apr 05, 2021 ***\par \par feels well, less active b/o the weather, regained some weight. received covid vaccine\par on Soliqua 60 units sq daily, metformin er 500 mg 4 tabs with dinner, steglatro 15 mg qd, Humalog 6-8 un ac D, synthroid 100 mcg, benicar 40mg, crestor 10 mg HS\par \par labs done 2 days ago- still pending\par \par Date of download:  4/5/21\par Diabetes Medications and Dosage: as above\par Indication for CGMS: verify a change in the treatment regimen, identify periods of hypoglycemia/ hyperglycemia. \par Modal day report: pattern. \par Pt with HYPO  1%% of the time (NONE below 54),  94% in target range\par Hyperglycemia:  5% elevation \par Identified issues: carbohydrate counting, occas spikes post dinner and hypos' at night (typically when injects higher amount of humalog)\par dates analyzed: 1/6/21-4/5/21\par \par *** Dec 31, 2020 ***\par \par feels great. lost more weight. walks more\par on Soliqua 60 units sq daily, metformin er 500 mg 4 tabs with dinner, steglatro 15 mg qd, Humalog 10 un ac D, synthroid 100 mcg, benicar 40mg, crestor 10 mg HS\par \par a1c- 5.6, LDL- 80, TSH- 0.53, FT4- 1.4\par \par on Dexcom G6\par \par Date of download:  12/31/20\par Diabetes Medications and Dosage: as above\par Indication for CGMS: verify a change in the treatment regimen, identify periods of hypoglycemia/ hyperglycemia. \par Modal day report: pattern. \par Pt with HYPO  1% of the time (0.1% below 54),  93% in target range\par Hyperglycemia: 6 % elevation \par Identified issues: carbohydrate counting\par dates analyzed:10/3/20-12/31/20\par \par *** Sep 21, 2020 ***\par \par on Soliqua 60 units sq daily, metformin er 500 mg 4 tabs with dinner, steglatro 15 mg qd, Humalog 10 un ac D, synthroid 100 mcg, benicar 40mg, crestor 10 mg HS\par off neurontin- "was not working", switched to nabumetone\par reports fbs-  110's, ppg- not checking\par feels well overall. lost 14 lbs since 01/20\par \par \par *** Televisit  Apr 27, 2020 ***\par \par feels well, no new c/o\par on Soliqua 60 units sq daily, metformin er 500 mg 4 tabs with dinner, steglatro 15 mg qd, Fiasp 10 un ac D, synthroid 100 mcg, benicar 40mg, crestor 10 mg HS\par \par Reports Average AM BS  -126, highest 169, lowest 91 ( midday)\par Average PM BS levels - 192, highest 322. lowest 116\par \par a1c - 6.3\par TG- 224, LDL- 70\par \par *** Jan 23, 2020 ***\par \par on Soliqua 60 units sq daily,  metformin er 500 mg 4 tabs with dinner,  steglatro 5 mg qd ,  Fiasp 6 un ac D,  synthroid 100 mcg, benicar 40mg, crestor 10 mg HS\par \par a1c- 6.8, TSH- 0.29, LDL- 73, TG- 239\par \par Log: fbs-  116- 167, ac L- 123-127, ac D- 172- 248\par \par *** Oct 24, 2019 ***\par \par on soliqua 57 un qd,  metformin er 500 mg 4 tabs with dinner, synthroid 100 mcg, benicar 40mg, crestor 10 mg HS\par 24h UFC- 15\par no repeat labs yet\par log: fbs- 110-183, with few episodes of mid 200's (when forgets soliqua), p/D- 183-327\par \par HISTORY OF PRESENT ILLNESS. \par \par Ms. EDWARD was diagnosed with Diabetes Mellitus Type 2 more than 15 years ago\par Reports history HTN, dyslipidemia, obesity, diabetic neuropathy, hypothyroidism. \par She denies CAD,  known complications of retinopathy, nephropathy. \par Presently on januvia 100 mg, metformin 1000 mg bid, armour 60 mg, benicar 40mg, crestor 10 mg HS\par She's not checking her blood sugars at home. States that forgets to take morning dose of metformin frequently.\par She states that her armour is expensive and wants to be switched to L-thyroxine.\par Fingerstick glucose in the office today is 184  mg/dL  (fasting). \par Diet: not following ADA\par Exercise: none\par \par Lab review: a1c- 9.7, ast/alt- 60/63, LDL- 71, TSH- 2.22, neg celiac screening\par \par \par ****\par \par Last dilated eye - 8/22\par Last podiatry visit  - 2021\par Last cardiology evaluation - about 3 years ago\par Last stress test - about 3 years ago (per patient- normal)\par Last 2-D Echo - about 3 years ago (per patient- normal)\par Last nephrology evaluation - none\par Last neurology evaluation- 2018\par

## 2023-01-05 NOTE — ASSESSMENT
[Importance of Diet and Exercise] : importance of diet and exercise to improve glycemic control, achieve weight loss and improve cardiovascular health [Exercise/Effect on Glucose] : exercise/effect on glucose [Glucagon Use] : glucagon use [Ketone Testing] : ketone testing [Insulin Self-Administration] : insulin self-administration [Injection Technique, Storage, Sharps Disposal] : injection technique, storage, and sharps disposal [Sick-Day Management] : sick-day management [Retinopathy Screening] : Patient was referred to ophthalmology for retinopathy screening [Diabetic Medications] : Risks and benefits of diabetic medications were discussed [Carbohydrate Consistent Diet] : carbohydrate consistent diet [Hypoglycemia Management] : hypoglycemia management [Diabetes Foot Care] : diabetes foot care [Long Term Vascular Complications] : long term vascular complications of diabetes [Action and use of Insulin] : action and use of short and long-acting insulin [Self Monitoring of Blood Glucose] : self monitoring of blood glucose [FreeTextEntry1] : 1. DM2, uncontrolled\par - intolerant of Mounjaro\par - resume Soliqua 30 un qd and uptitrate\par - metformin er 500 mg 4 tabs with dinner, steglatro 15 mg qd\par - continue Benicar, and resume Crestor\par - we discussed Plenity to help with weight loss, but she wants to focus on the diet first\par \par 2. Hypothyroidism\par - synthroid 88 mcg\par - monitor TSH \par RTC 4 mos

## 2023-02-09 ENCOUNTER — RX RENEWAL (OUTPATIENT)
Age: 53
End: 2023-02-09

## 2023-02-09 RX ORDER — BLOOD-GLUCOSE TRANSMITTER
EACH MISCELLANEOUS
Qty: 1 | Refills: 3 | Status: ACTIVE | COMMUNITY
Start: 2020-09-21 | End: 1900-01-01

## 2023-02-09 RX ORDER — BLOOD-GLUCOSE SENSOR
EACH MISCELLANEOUS
Qty: 9 | Refills: 3 | Status: ACTIVE | COMMUNITY
Start: 2020-09-21 | End: 1900-01-01

## 2023-05-08 ENCOUNTER — APPOINTMENT (OUTPATIENT)
Dept: ENDOCRINOLOGY | Facility: CLINIC | Age: 53
End: 2023-05-08
Payer: COMMERCIAL

## 2023-05-08 VITALS
SYSTOLIC BLOOD PRESSURE: 128 MMHG | HEART RATE: 74 BPM | DIASTOLIC BLOOD PRESSURE: 70 MMHG | HEIGHT: 64 IN | BODY MASS INDEX: 35.34 KG/M2 | OXYGEN SATURATION: 98 % | WEIGHT: 207 LBS | RESPIRATION RATE: 16 BRPM | TEMPERATURE: 97.3 F

## 2023-05-08 LAB — GLUCOSE BLDC GLUCOMTR-MCNC: 167

## 2023-05-08 PROCEDURE — 82962 GLUCOSE BLOOD TEST: CPT

## 2023-05-08 PROCEDURE — 95251 CONT GLUC MNTR ANALYSIS I&R: CPT

## 2023-05-08 PROCEDURE — 99214 OFFICE O/P EST MOD 30 MIN: CPT | Mod: 25

## 2023-05-08 RX ORDER — INSULIN LISPRO 100 [IU]/ML
100 INJECTION, SOLUTION INTRAVENOUS; SUBCUTANEOUS
Qty: 1 | Refills: 6 | Status: ACTIVE | COMMUNITY
Start: 2019-10-24 | End: 1900-01-01

## 2023-05-08 NOTE — HISTORY OF PRESENT ILLNESS
[FreeTextEntry1] : 53 yo F f/u for multiple issues\par \par *** May 08, 2023 ***\par \par feels well, starting Noom diet\par back on Soliqua 46 to 48 un qd, metformin er 500 mg 4 tabs with dinner, steglatro 15 mg qd, synthroid 88 mcg\par labs from 4/27/23 reviewed- a1c- 6.8, TG- 217, LDL- 64, TSH-0.257, 25D- 68.9\par Date of download:  05/08/2023 \par Diabetes Medications and Dosage: as above\par Indication for CGMS: verify a change in the treatment regimen, identify periods of hypoglycemia/ hyperglycemia. \par Modal day report: pattern. \par Pt with HYPO  0% of the time ( NONE below 54),  84% in target range\par Hyperglycemia:  16% elevation \par Identified issues: carbohydrate counting\par dates analyzed: 04/24/2023 - 05/08/2023\par \par *** Jan 05, 2023 ***\par \par stopped Mounjaro after 1 month due to nausea/ GI distress\par had issues with receiving Tresiba, did not resume Soliqua. diet has been worse within the past 2 months\par was also off Crestor for sometime\par taking  metformin er 500 mg 4 tabs with dinner, steglatro 15 mg qd only\par \par Date of download:  01/05/2023 \par Diabetes Medications and Dosage: as above\par Indication for CGMS: verify a change in the treatment regimen, identify periods of hypoglycemia/ hyperglycemia. \par Modal day report: pattern. \par Pt with HYPO  0% of the time ( NONE below 54),  25% in target range\par Hyperglycemia:  75% elevation \par Identified issues: carbohydrate counting\par dates analyzed:  12/23/2022- 01/05/2023\par \par labs from 12/23/22- a1c- 6.3, TG- 363, LDL- 166, TC- 281, TSH- 0.53\par \par *** Aug 31, 2022 ***\par \par feels fine\par  self decreased Soliqua to 35 un qd, metformin er 500 mg 4 tabs with dinner, steglatro 15 mg qd,  synthroid 88 mcg, benicar 40mg, crestor 10 mg HS\par wearing gris\par Date of download:  08/31/2022 \par Diabetes Medications and Dosage: as above\par Indication for CGMS: verify a change in the treatment regimen, identify periods of hypoglycemia/ hyperglycemia. \par Modal day report: pattern. \par Pt with HYPO  0% of the time ( NONE below 54),  78% in target range\par Hyperglycemia:  22% elevation \par Identified issues: carbohydrate counting\par dates analyzed:  08/18/2022- 08/31/2022\par \par labs from 8/16/22- a1c- 6.8%, TSH- 0.439, LDL- 72\par \par *** Feb 01, 2022 ***\par \par feels well, no new c/o\par on Soliqua 57 units sq daily, metformin er 500 mg 4 tabs with dinner, steglatro 15 mg qd,  synthroid 88 mcg, benicar 40mg, crestor 10 mg HS\par wearing dexcom\par Date of download:  2/1/22\par Diabetes Medications and Dosage: as above\par Indication for CGMS: verify a change in the treatment regimen, identify periods of hypoglycemia/ hyperglycemia. \par Modal day report: pattern. \par Pt with HYPO  3% of the time (<1% below 54),  91% in target range\par Hyperglycemia:  6% elevation \par Identified issues: carbohydrate counting\par dates analyzed: 1/19/22-2/1/22\par \par labs from 1/14/22- a1c- 6.1, LDL- 104, creat- 0.74\par \par *** Aug 09, 2021 ***\par \par on Soliqua 60 units sq daily, metformin er 500 mg 4 tabs with dinner, steglatro 15 mg qd,  synthroid 88 mcg, benicar 40mg, crestor 10 mg HS\par \par Date of download:  8/6/21\par Diabetes Medications and Dosage: as above\par Indication for CGMS: verify a change in the treatment regimen, identify periods of hypoglycemia/ hyperglycemia. \par Modal day report: pattern. \par Pt with HYPO  3% of the time (<1% below 54),  89% in target range\par Hyperglycemia:  7% elevation \par Identified issues: carbohydrate counting\par dates analyzed: 4/21/21-7/19/21\par \par labs from 7/24/21- a1c- 5.6, LDL-80, TSH- 0.87, 25D- 54\par \par *** Apr 05, 2021 ***\par \par feels well, less active b/o the weather, regained some weight. received covid vaccine\par on Soliqua 60 units sq daily, metformin er 500 mg 4 tabs with dinner, steglatro 15 mg qd, Humalog 6-8 un ac D, synthroid 100 mcg, benicar 40mg, crestor 10 mg HS\par \par labs done 2 days ago- still pending\par \par Date of download:  4/5/21\par Diabetes Medications and Dosage: as above\par Indication for CGMS: verify a change in the treatment regimen, identify periods of hypoglycemia/ hyperglycemia. \par Modal day report: pattern. \par Pt with HYPO  1%% of the time (NONE below 54),  94% in target range\par Hyperglycemia:  5% elevation \par Identified issues: carbohydrate counting, occas spikes post dinner and hypos' at night (typically when injects higher amount of humalog)\par dates analyzed: 1/6/21-4/5/21\par \par *** Dec 31, 2020 ***\par \par feels great. lost more weight. walks more\par on Soliqua 60 units sq daily, metformin er 500 mg 4 tabs with dinner, steglatro 15 mg qd, Humalog 10 un ac D, synthroid 100 mcg, benicar 40mg, crestor 10 mg HS\par \par a1c- 5.6, LDL- 80, TSH- 0.53, FT4- 1.4\par \par on Dexcom G6\par \par Date of download:  12/31/20\par Diabetes Medications and Dosage: as above\par Indication for CGMS: verify a change in the treatment regimen, identify periods of hypoglycemia/ hyperglycemia. \par Modal day report: pattern. \par Pt with HYPO  1% of the time (0.1% below 54),  93% in target range\par Hyperglycemia: 6 % elevation \par Identified issues: carbohydrate counting\par dates analyzed:10/3/20-12/31/20\par \par *** Sep 21, 2020 ***\par \par on Soliqua 60 units sq daily, metformin er 500 mg 4 tabs with dinner, steglatro 15 mg qd, Humalog 10 un ac D, synthroid 100 mcg, benicar 40mg, crestor 10 mg HS\par off neurontin- "was not working", switched to nabumetone\par reports fbs-  110's, ppg- not checking\par feels well overall. lost 14 lbs since 01/20\par \par \par *** Televisit  Apr 27, 2020 ***\par \par feels well, no new c/o\par on Soliqua 60 units sq daily, metformin er 500 mg 4 tabs with dinner, steglatro 15 mg qd, Fiasp 10 un ac D, synthroid 100 mcg, benicar 40mg, crestor 10 mg HS\par \par Reports Average AM BS  -126, highest 169, lowest 91 ( midday)\par Average PM BS levels - 192, highest 322. lowest 116\par \par a1c - 6.3\par TG- 224, LDL- 70\par \par *** Jan 23, 2020 ***\par \par on Soliqua 60 units sq daily,  metformin er 500 mg 4 tabs with dinner,  steglatro 5 mg qd ,  Fiasp 6 un ac D,  synthroid 100 mcg, benicar 40mg, crestor 10 mg HS\par \par a1c- 6.8, TSH- 0.29, LDL- 73, TG- 239\par \par Log: fbs-  116- 167, ac L- 123-127, ac D- 172- 248\par \par *** Oct 24, 2019 ***\par \par on soliqua 57 un qd,  metformin er 500 mg 4 tabs with dinner, synthroid 100 mcg, benicar 40mg, crestor 10 mg HS\par 24h UFC- 15\par no repeat labs yet\par log: fbs- 110-183, with few episodes of mid 200's (when forgets soliqua), p/D- 183-327\par \par HISTORY OF PRESENT ILLNESS. \par \par Ms. EDWARD was diagnosed with Diabetes Mellitus Type 2 more than 15 years ago\par Reports history HTN, dyslipidemia, obesity, diabetic neuropathy, hypothyroidism. \par She denies CAD,  known complications of retinopathy, nephropathy. \par Presently on januvia 100 mg, metformin 1000 mg bid, armour 60 mg, benicar 40mg, crestor 10 mg HS\par She's not checking her blood sugars at home. States that forgets to take morning dose of metformin frequently.\par She states that her armour is expensive and wants to be switched to L-thyroxine.\par Fingerstick glucose in the office today is 184  mg/dL  (fasting). \par Diet: not following ADA\par Exercise: none\par \par Lab review: a1c- 9.7, ast/alt- 60/63, LDL- 71, TSH- 2.22, neg celiac screening\par \par \par ****\par \par Last dilated eye - 8/22\par Last podiatry visit  - 2021\par Last cardiology evaluation - about 3 years ago\par Last stress test - about 3 years ago (per patient- normal)\par Last 2-D Echo - about 3 years ago (per patient- normal)\par Last nephrology evaluation - none\par Last neurology evaluation- 2018\par

## 2023-05-08 NOTE — ASSESSMENT
[Diabetes Foot Care] : diabetes foot care [Long Term Vascular Complications] : long term vascular complications of diabetes [Carbohydrate Consistent Diet] : carbohydrate consistent diet [Importance of Diet and Exercise] : importance of diet and exercise to improve glycemic control, achieve weight loss and improve cardiovascular health [Exercise/Effect on Glucose] : exercise/effect on glucose [Hypoglycemia Management] : hypoglycemia management [Glucagon Use] : glucagon use [Ketone Testing] : ketone testing [Action and use of Insulin] : action and use of short and long-acting insulin [Self Monitoring of Blood Glucose] : self monitoring of blood glucose [Insulin Self-Administration] : insulin self-administration [Injection Technique, Storage, Sharps Disposal] : injection technique, storage, and sharps disposal [Sick-Day Management] : sick-day management [Retinopathy Screening] : Patient was referred to ophthalmology for retinopathy screening [Diabetic Medications] : Risks and benefits of diabetic medications were discussed [FreeTextEntry1] : 1. DM2, suboptimally controlled\par - intolerant of Mounjaro\par - Soliqua 48 un qd and uptitrate\par - metformin er 500 mg 4 tabs with dinner, steglatro 15 mg qd\par - add Humalog 5 un ac dinner\par - continue Benicar Crestor\par - we discussed Plenity to help with weight loss, but she wants to focus on the diet first\par \par 2. Hypothyroidism\par - decrease synthroid 75 mcg\par - monitor TSH \par RTC 4 mos

## 2023-08-03 ENCOUNTER — APPOINTMENT (OUTPATIENT)
Dept: ULTRASOUND IMAGING | Facility: CLINIC | Age: 53
End: 2023-08-03

## 2023-08-23 ENCOUNTER — APPOINTMENT (OUTPATIENT)
Dept: ULTRASOUND IMAGING | Facility: CLINIC | Age: 53
End: 2023-08-23
Payer: COMMERCIAL

## 2023-08-23 ENCOUNTER — OUTPATIENT (OUTPATIENT)
Dept: OUTPATIENT SERVICES | Facility: HOSPITAL | Age: 53
LOS: 1 days | End: 2023-08-23

## 2023-08-23 PROCEDURE — 76856 US EXAM PELVIC COMPLETE: CPT | Mod: 26

## 2023-08-23 PROCEDURE — 76830 TRANSVAGINAL US NON-OB: CPT | Mod: 26

## 2023-09-11 ENCOUNTER — TRANSCRIPTION ENCOUNTER (OUTPATIENT)
Age: 53
End: 2023-09-11

## 2023-10-02 ENCOUNTER — APPOINTMENT (OUTPATIENT)
Dept: ENDOCRINOLOGY | Facility: CLINIC | Age: 53
End: 2023-10-02
Payer: COMMERCIAL

## 2023-10-02 VITALS
WEIGHT: 206 LBS | BODY MASS INDEX: 35.17 KG/M2 | DIASTOLIC BLOOD PRESSURE: 82 MMHG | HEIGHT: 64 IN | SYSTOLIC BLOOD PRESSURE: 121 MMHG | OXYGEN SATURATION: 98 % | HEART RATE: 100 BPM

## 2023-10-02 LAB — GLUCOSE BLDC GLUCOMTR-MCNC: 108

## 2023-10-02 PROCEDURE — 99214 OFFICE O/P EST MOD 30 MIN: CPT | Mod: 25

## 2023-10-02 PROCEDURE — 95251 CONT GLUC MNTR ANALYSIS I&R: CPT

## 2023-10-06 ENCOUNTER — RX RENEWAL (OUTPATIENT)
Age: 53
End: 2023-10-06

## 2023-10-06 RX ORDER — PEN NEEDLE, DIABETIC 29 G X1/2"
32G X 4 MM NEEDLE, DISPOSABLE MISCELLANEOUS
Qty: 270 | Refills: 3 | Status: ACTIVE | COMMUNITY
Start: 2019-07-26 | End: 1900-01-01

## 2023-10-10 ENCOUNTER — TRANSCRIPTION ENCOUNTER (OUTPATIENT)
Age: 53
End: 2023-10-10

## 2023-10-10 RX ORDER — LANCETS 28 GAUGE
EACH MISCELLANEOUS
Qty: 1 | Refills: 0 | Status: ACTIVE | COMMUNITY
Start: 2023-10-10 | End: 1900-01-01

## 2023-10-10 RX ORDER — BLOOD SUGAR DIAGNOSTIC
STRIP MISCELLANEOUS
Qty: 90 | Refills: 0 | Status: ACTIVE | COMMUNITY
Start: 2023-10-10 | End: 1900-01-01

## 2023-12-12 ENCOUNTER — NON-APPOINTMENT (OUTPATIENT)
Age: 53
End: 2023-12-12

## 2023-12-21 ENCOUNTER — NON-APPOINTMENT (OUTPATIENT)
Age: 53
End: 2023-12-21

## 2023-12-21 ENCOUNTER — APPOINTMENT (OUTPATIENT)
Dept: SURGICAL ONCOLOGY | Facility: CLINIC | Age: 53
End: 2023-12-21
Payer: COMMERCIAL

## 2023-12-21 VITALS
WEIGHT: 206 LBS | HEART RATE: 78 BPM | BODY MASS INDEX: 35.17 KG/M2 | SYSTOLIC BLOOD PRESSURE: 140 MMHG | DIASTOLIC BLOOD PRESSURE: 89 MMHG | HEIGHT: 64 IN | OXYGEN SATURATION: 98 %

## 2023-12-21 DIAGNOSIS — Z80.3 FAMILY HISTORY OF MALIGNANT NEOPLASM OF BREAST: ICD-10-CM

## 2023-12-21 PROCEDURE — 99205 OFFICE O/P NEW HI 60 MIN: CPT

## 2023-12-21 NOTE — CONSULT LETTER
[Dear  ___] : Dear  [unfilled], [Consult Letter:] : I had the pleasure of evaluating your patient, [unfilled]. [Please see my note below.] : Please see my note below. [Consult Closing:] : Thank you very much for allowing me to participate in the care of this patient.  If you have any questions, please do not hesitate to contact me. [Sincerely,] : Sincerely, [FreeTextEntry2] : Jaye Zavaleta MD [FreeTextEntry3] : Pauly Vasquez MD Breast Surgeon Division of Surgical Oncology Department of Surgery [address] [place] [address] Tel: [phone] Fax: [phone] Email: [email or web address] [DrKulwinder  ___] : Dr. RUIZ

## 2023-12-21 NOTE — PAST MEDICAL HISTORY
[Perimenopausal] : The patient is perimenopausal [Menarche Age ____] : age at menarche was [unfilled] [History of Hormone Replacement Treatment] : has no history of hormone replacement treatment [Approximately ___] : the LMP was approximately [unfilled] [Irregular Cycle Intervals] : are  irregular [Total Preg ___] : G[unfilled] [FreeTextEntry6] : none [FreeTextEntry7] : <1 yr [FreeTextEntry8] : n/a

## 2023-12-21 NOTE — HISTORY OF PRESENT ILLNESS
[FreeTextEntry1] : This is a 53 year-old F referred by Dr. Jaye Zavaleta for evaluation of R breast IDC, here for initial consultation.  She is accompanied by her mom, Neisha Aldrich who is also here as a new diagnosis of cancer.  Prior history: The patient reports that she has had only 2 or 3 mammograms throughout her life. Her last mammogram was approximately 5 years ago. She has never had any abnormal findings, nor any breast biopsies. She has been encouraged to resume mammography for some time now, and she went for imaging in November. She did not have any breast complaints at that time--denies any masses, skin changes, nipple discharge.  Recent imagin2023 B/L SM ([facility]) revealed SFGD -R UOQ 7 mm irregular nodule (also seen on same day US) -L neg  -BR4  2023 B/L US ([facility]) -R 10:00 N8 7 mm nodule (corresponding to finding on mammogram)--> f/u USG-[facility] -R 11:00 sub centimeter cyst -L neg -BR4  2023 R [facility]G-[facility] ([facility]) R 10:00 N8 (straight): IDC (G1), ER >95%, AL >95%, HER2 0  PMH:  HTN, HLD, DM, hypothyroid, arthritis PSH:  kidney stone surgery, back surgery Meds:  synthroid, Benicar, insulin, amlodipine, Crestor, Prilosec, metformin, ASA 81, Steglatro, NSAIDS PRN, Steroids PRN for arthritis ALL:  Denies SH:  No tobacco. Rare EtOH FH: Mat GF skin cancer. Mother breast cancer age 75. GYN: Menarche 11. LMP within 1 year, irregular. . OCP <1 year. Fertility none. HRT none

## 2023-12-21 NOTE — PHYSICAL EXAM
[Normocephalic] : normocephalic [Atraumatic] : atraumatic [EOMI] : extra ocular movement intact [PERRL] : pupils equal, round and reactive to light [Sclera nonicteric] : sclera nonicteric [Supple] : supple [No Supraclavicular Adenopathy] : no supraclavicular adenopathy [Examined in the supine and seated position] : examined in the supine and seated position [Symmetrical] : symmetrical [Bra Size: ___] : Bra Size: [unfilled] [No dominant masses] : no dominant masses in right breast  [No dominant masses] : no dominant masses left breast [No Nipple Retraction] : no left nipple retraction [No Nipple Discharge] : no left nipple discharge [Breast Mass Left Breast ___cm] : no masses [Breast Nipple Inversion] : nipples not inverted [Breast Nipple Retraction] : nipples not retracted [Breast Nipple Flattening] : nipples not flattened [Breast Nipple Fissures] : nipples not fissured [Breast Abnormal Lactation (Galactorrhea)] : no galactorrhea [Breast Abnormal Secretion Bloody Fluid] : no bloody discharge [Breast Abnormal Secretion Serous Fluid] : no serous discharge [Breast Abnormal Secretion Opalescent Fluid] : no milky discharge [No Axillary Lymphadenopathy] : no left axillary lymphadenopathy [No Edema] : no edema [No Rashes] : no rashes [No Ulceration] : no ulceration [de-identified] : non-labored respirations  [de-identified] : UOQ ecchymosis and hematoma

## 2023-12-21 NOTE — ASSESSMENT
[FreeTextEntry1] : The patient is a 53 year old female referred for consultation by Dr. 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 First we discussed her histopathology and biomarkers in terms of prognosis and adjuvant therapy. We discussed surgical options including mastectomy versus lumpectomy. She understands that she would not get a better outcome or longer survival with the mastectomy, although risk of local recurrence is lower. After breast conserving surgery, she may see asymmetry in size. She understands that radiation therapy and postoperative evaluation by Medical Oncology are integral parts of breast-conserving treatment and these will be addressed postoperatively. If we proceed with mastectomy, there is a still a chance that radiation will be recommended but less likely.   If we proceed with lumpectomy, she understands that it will be important to obtain clear margins and there is a 5-10% risk of having to go back for additional tissue. With mastectomy, there is still a small amount of breast tissue (probably on the order of 5%) that remains which will continue to require yearly clinical examination.  With mastectomy, she can have reconstruction done at the same time. We briefly discussed different options.She is not interested.   The risks of surgery include bleeding, infection, scarring, numbness, possible discrepancy in breast size with lumpectomy/reconstructed breast.  At the time of lumpectomy, a pre-operative localization of the lesion is required.   An MRI is recommended for further evaluation of disease extent and the possible presence of multicentric or contralateral disease.      We discussed the role of sentinel node biopsy. This will confirm the stage and extent of disease. The risk of lymphedema is 5% with a sentinel lymph node biopsy and 15% with an axillary lymph node dissection.   Preoperative, intraoperative, and postoperative considerations were reviewed including anesthetic management and what she can expect when she is recovering from surgery. Risks, benefits, alternatives, and various management options were discussed with the patient.  Following our discussion, we have decided to proceed with Right breast lumpectomy with MagSeed localization (1 site), R SLNB.    Ms Peña verbalizes her understanding of the procedure and the risks/benefits/complications and alternatives were discussed questions were answered. She knows to call me if she has any additional questions or concerns.   We discussed the risks, benefits and limitations, and implications of genetic testing. We also discussed the psychosocial implications of genetic testing. Possible test results were reviewed along with associated medical management options. Ms Peña consented to genetic testing and blood was drawn and sent to [facility] today.    Plan:  - MRI  - GT sent today  - Right breast lumpectomy with MagSeed localization (1 site), R SLNB.  - Medical clearance

## 2023-12-27 ENCOUNTER — NON-APPOINTMENT (OUTPATIENT)
Age: 53
End: 2023-12-27

## 2024-01-04 ENCOUNTER — TRANSCRIPTION ENCOUNTER (OUTPATIENT)
Age: 54
End: 2024-01-04

## 2024-01-05 ENCOUNTER — RX RENEWAL (OUTPATIENT)
Age: 54
End: 2024-01-05

## 2024-01-05 RX ORDER — ERTUGLIFLOZIN 15 MG/1
15 TABLET, FILM COATED ORAL
Qty: 90 | Refills: 3 | Status: ACTIVE | COMMUNITY
Start: 2019-10-24 | End: 1900-01-01

## 2024-01-05 RX ORDER — LEVOTHYROXINE SODIUM 0.07 MG/1
75 TABLET ORAL
Qty: 90 | Refills: 3 | Status: ACTIVE | COMMUNITY
Start: 2019-07-26 | End: 1900-01-01

## 2024-01-05 RX ORDER — METFORMIN ER 500 MG 500 MG/1
500 TABLET ORAL
Qty: 360 | Refills: 3 | Status: ACTIVE | COMMUNITY
Start: 2019-07-26 | End: 1900-01-01

## 2024-01-09 ENCOUNTER — APPOINTMENT (OUTPATIENT)
Dept: INTERNAL MEDICINE | Facility: CLINIC | Age: 54
End: 2024-01-09
Payer: COMMERCIAL

## 2024-01-09 ENCOUNTER — NON-APPOINTMENT (OUTPATIENT)
Age: 54
End: 2024-01-09

## 2024-01-09 ENCOUNTER — TRANSCRIPTION ENCOUNTER (OUTPATIENT)
Age: 54
End: 2024-01-09

## 2024-01-09 ENCOUNTER — APPOINTMENT (OUTPATIENT)
Dept: MRI IMAGING | Facility: CLINIC | Age: 54
End: 2024-01-09
Payer: COMMERCIAL

## 2024-01-09 ENCOUNTER — OUTPATIENT (OUTPATIENT)
Dept: OUTPATIENT SERVICES | Facility: HOSPITAL | Age: 54
LOS: 1 days | End: 2024-01-09
Payer: COMMERCIAL

## 2024-01-09 VITALS
HEIGHT: 64 IN | TEMPERATURE: 97.8 F | BODY MASS INDEX: 35.17 KG/M2 | WEIGHT: 206 LBS | OXYGEN SATURATION: 98 % | HEART RATE: 80 BPM | SYSTOLIC BLOOD PRESSURE: 110 MMHG | DIASTOLIC BLOOD PRESSURE: 80 MMHG | RESPIRATION RATE: 17 BRPM

## 2024-01-09 DIAGNOSIS — C50.911 MALIGNANT NEOPLASM OF UNSPECIFIED SITE OF RIGHT FEMALE BREAST: ICD-10-CM

## 2024-01-09 DIAGNOSIS — L40.9 PSORIASIS, UNSPECIFIED: ICD-10-CM

## 2024-01-09 PROCEDURE — 99214 OFFICE O/P EST MOD 30 MIN: CPT

## 2024-01-09 PROCEDURE — A9585: CPT

## 2024-01-09 PROCEDURE — 93000 ELECTROCARDIOGRAM COMPLETE: CPT

## 2024-01-09 PROCEDURE — C8937: CPT

## 2024-01-09 PROCEDURE — 77049 MRI BREAST C-+ W/CAD BI: CPT | Mod: 26

## 2024-01-09 PROCEDURE — C8908: CPT

## 2024-01-09 PROCEDURE — G2211 COMPLEX E/M VISIT ADD ON: CPT

## 2024-01-11 DIAGNOSIS — R59.9 ENLARGED LYMPH NODES, UNSPECIFIED: ICD-10-CM

## 2024-01-12 ENCOUNTER — NON-APPOINTMENT (OUTPATIENT)
Age: 54
End: 2024-01-12

## 2024-01-14 LAB
APPEARANCE: CLEAR
BACTERIA UR CULT: NORMAL
BACTERIA: ABNORMAL /HPF
BILIRUBIN URINE: NEGATIVE
BLOOD URINE: NEGATIVE
CAST: 0 /LPF
COLOR: NORMAL
EPITHELIAL CELLS: 2 /HPF
GLUCOSE QUALITATIVE U: 500 MG/DL
HYALINE CASTS: PRESENT
KETONES URINE: NEGATIVE MG/DL
LEUKOCYTE ESTERASE URINE: NEGATIVE
MICROSCOPIC-UA: NORMAL
NITRITE URINE: NEGATIVE
PH URINE: 5.5
PROTEIN URINE: NEGATIVE MG/DL
RED BLOOD CELLS URINE: 1 /HPF
REVIEW: NORMAL
SPECIFIC GRAVITY URINE: >1.03
UROBILINOGEN URINE: 0.2 MG/DL
WHITE BLOOD CELLS URINE: 1 /HPF
YEAST-LIKE CELLS: PRESENT

## 2024-01-15 ENCOUNTER — NON-APPOINTMENT (OUTPATIENT)
Age: 54
End: 2024-01-15

## 2024-01-15 ENCOUNTER — TRANSCRIPTION ENCOUNTER (OUTPATIENT)
Age: 54
End: 2024-01-15

## 2024-01-19 ENCOUNTER — OUTPATIENT (OUTPATIENT)
Dept: OUTPATIENT SERVICES | Facility: HOSPITAL | Age: 54
LOS: 1 days | End: 2024-01-19
Payer: COMMERCIAL

## 2024-01-19 ENCOUNTER — RESULT REVIEW (OUTPATIENT)
Age: 54
End: 2024-01-19

## 2024-01-19 DIAGNOSIS — R92.8 OTHER ABNORMAL AND INCONCLUSIVE FINDINGS ON DIAGNOSTIC IMAGING OF BREAST: ICD-10-CM

## 2024-01-19 PROCEDURE — 88321 CONSLTJ&REPRT SLD PREP ELSWR: CPT

## 2024-01-22 ENCOUNTER — APPOINTMENT (OUTPATIENT)
Dept: PHYSICAL MEDICINE AND REHAB | Facility: CLINIC | Age: 54
End: 2024-01-22
Payer: COMMERCIAL

## 2024-01-22 VITALS
WEIGHT: 206 LBS | OXYGEN SATURATION: 99 % | BODY MASS INDEX: 35.17 KG/M2 | DIASTOLIC BLOOD PRESSURE: 84 MMHG | HEART RATE: 78 BPM | SYSTOLIC BLOOD PRESSURE: 142 MMHG | RESPIRATION RATE: 18 BRPM | TEMPERATURE: 97.4 F | HEIGHT: 64 IN

## 2024-01-22 DIAGNOSIS — M25.532 PAIN IN RIGHT WRIST: ICD-10-CM

## 2024-01-22 DIAGNOSIS — M25.531 PAIN IN RIGHT WRIST: ICD-10-CM

## 2024-01-22 PROCEDURE — 99204 OFFICE O/P NEW MOD 45 MIN: CPT

## 2024-01-22 RX ORDER — DICLOFENAC SODIUM 1% 10 MG/G
1 GEL TOPICAL
Qty: 1 | Refills: 3 | Status: ACTIVE | COMMUNITY
Start: 2024-01-22 | End: 1900-01-01

## 2024-01-29 ENCOUNTER — APPOINTMENT (OUTPATIENT)
Dept: ULTRASOUND IMAGING | Facility: IMAGING CENTER | Age: 54
End: 2024-01-29
Payer: COMMERCIAL

## 2024-01-29 ENCOUNTER — OUTPATIENT (OUTPATIENT)
Dept: OUTPATIENT SERVICES | Facility: HOSPITAL | Age: 54
LOS: 1 days | End: 2024-01-29
Payer: COMMERCIAL

## 2024-01-29 ENCOUNTER — RESULT REVIEW (OUTPATIENT)
Age: 54
End: 2024-01-29

## 2024-01-29 DIAGNOSIS — R59.9 ENLARGED LYMPH NODES, UNSPECIFIED: ICD-10-CM

## 2024-01-29 DIAGNOSIS — C50.911 MALIGNANT NEOPLASM OF UNSPECIFIED SITE OF RIGHT FEMALE BREAST: ICD-10-CM

## 2024-01-29 PROCEDURE — 76882 US LMTD JT/FCL EVL NVASC XTR: CPT | Mod: 26,RT

## 2024-01-29 PROCEDURE — 76882 US LMTD JT/FCL EVL NVASC XTR: CPT

## 2024-01-30 PROBLEM — M25.531 PAIN OF BOTH WRIST JOINTS: Status: ACTIVE | Noted: 2024-01-22

## 2024-01-30 NOTE — REVIEW OF SYSTEMS
[Fever] : no fever [Eye Pain] : no eye pain [Earache] : no earache [Chest Pain] : no chest pain [Shortness Of Breath] : no shortness of breath [Abdominal Pain] : no abdominal pain [Dysuria] : no dysuria [Joint Pain] : joint pain [Joint Stiffness] : joint stiffness [Muscle Pain] : muscle pain [Muscle Weakness] : muscle weakness [Skin Wound] : no skin wound [Dizziness] : no dizziness [Insomnia] : no insomnia [Easy Bruising] : no tendency for easy bruising

## 2024-01-30 NOTE — HISTORY OF PRESENT ILLNESS
[FreeTextEntry1] : 53 year old female with bilateral hand pain for 4 years She is a  uses a computer throughout the day The pain is over the medial /lateral wrist. The pain is aggravated with use of her hands especially with typing. She has had multiple courses of therapy.  The therapy only reduces the pain.  She can still has a low level constant pain. She has seen many physicians for her wrist pain.  She had been tried on many NSAIDs.  She presently is on indomethacin 50 mg 1 p.o. 3 times daily as needed for pain with food.  Pain:  2/10 Worse: 10/10 Quality: sharp  Frequency: constant The pain starts over the medial and lateral aspect of the wrist.  The pain does not radiate toward the forearm.  Pain is worse with .  She denies a history of neck pain.  Presents for a trial of acupuncture.

## 2024-01-30 NOTE — PHYSICAL EXAM
[FreeTextEntry1] : Pleasant, in no distress. Language: English HEENT: Head: no trauma. Eyes: no discharge. Ears: No discharge. Nose No discharge. Throat: clear Neck: FAROM. Negative Spurlings Heart: RR, +S1, S2 Lungs: CTA Abdomen: soft, NT Lumbar spine: FAROM, no spasm  LUE: Shoulder:FAROM, MS 5/5 Elbow: FAROM, MS 5/5 reflexes 2/4 Wrist: FAROM, MS 5/5 reflexes 2/4 Warm, tender, pulse 2+ Positive Finkelstein's.  Negative Tinel's  RUE:Shoulder:FAROM, MS 5/5 Elbow: FAROM, MS 5/5 reflexes 2/4 Wrist: FAROM, MS 5/5 reflexes 2/4 Warm, tender, pulse 2+ Positive Finkelstein's.  Negative Tinel's  LLE: Hip: FAROM, MS 5/5 Knee: FAROM, MS 5/5 reflexes 2/4 Ankle: FAROM, MS 5/5 reflexes 2/4 Warm , nontender, pulse 2+ negative homans  RLE: Hip: FAROM, MS 5/5 Knee: FAROM, MS 5/5 reflexes 2/4 Ankle: FAROM, MS 5/5 reflexes 2/4 Warm , nontender, pulse 2+ negative homans  Gait: Spontaneous, reciprocal, safe without an assistive device  Sensation RUE: sensation is intact to light touch, pinprick  and proprioception LUE: sensation is intact to light touch, pinprick  and proprioception RLE: sensation is intact to light touch, pinprick  and proprioception. Neg SLR. Neg RUDDY, Neg LANNY LLE: sensation is intact to light touch, pinprick  and proprioception. Neg SLR. Neg RUDDY, Neg GUICHOIR

## 2024-02-01 ENCOUNTER — NON-APPOINTMENT (OUTPATIENT)
Age: 54
End: 2024-02-01

## 2024-02-01 ENCOUNTER — RESULT REVIEW (OUTPATIENT)
Age: 54
End: 2024-02-01

## 2024-02-08 ENCOUNTER — APPOINTMENT (OUTPATIENT)
Dept: MAMMOGRAPHY | Facility: IMAGING CENTER | Age: 54
End: 2024-02-08
Payer: COMMERCIAL

## 2024-02-08 ENCOUNTER — OUTPATIENT (OUTPATIENT)
Dept: OUTPATIENT SERVICES | Facility: HOSPITAL | Age: 54
LOS: 1 days | End: 2024-02-08
Payer: COMMERCIAL

## 2024-02-08 DIAGNOSIS — C50.911 MALIGNANT NEOPLASM OF UNSPECIFIED SITE OF RIGHT FEMALE BREAST: ICD-10-CM

## 2024-02-08 PROCEDURE — A4648: CPT

## 2024-02-08 PROCEDURE — 19281 PERQ DEVICE BREAST 1ST IMAG: CPT

## 2024-02-08 PROCEDURE — 19281 PERQ DEVICE BREAST 1ST IMAG: CPT | Mod: RT

## 2024-02-12 ENCOUNTER — TRANSCRIPTION ENCOUNTER (OUTPATIENT)
Age: 54
End: 2024-02-12

## 2024-02-12 ENCOUNTER — OUTPATIENT (OUTPATIENT)
Dept: OUTPATIENT SERVICES | Facility: HOSPITAL | Age: 54
LOS: 1 days | End: 2024-02-12

## 2024-02-12 VITALS
WEIGHT: 210.98 LBS | SYSTOLIC BLOOD PRESSURE: 117 MMHG | TEMPERATURE: 97 F | DIASTOLIC BLOOD PRESSURE: 75 MMHG | HEART RATE: 80 BPM | HEIGHT: 64 IN | RESPIRATION RATE: 15 BRPM | OXYGEN SATURATION: 97 %

## 2024-02-12 DIAGNOSIS — E11.9 TYPE 2 DIABETES MELLITUS WITHOUT COMPLICATIONS: ICD-10-CM

## 2024-02-12 DIAGNOSIS — C50.911 MALIGNANT NEOPLASM OF UNSPECIFIED SITE OF RIGHT FEMALE BREAST: ICD-10-CM

## 2024-02-12 DIAGNOSIS — I10 ESSENTIAL (PRIMARY) HYPERTENSION: ICD-10-CM

## 2024-02-12 DIAGNOSIS — E03.9 HYPOTHYROIDISM, UNSPECIFIED: ICD-10-CM

## 2024-02-12 DIAGNOSIS — Z91.89 OTHER SPECIFIED PERSONAL RISK FACTORS, NOT ELSEWHERE CLASSIFIED: ICD-10-CM

## 2024-02-12 DIAGNOSIS — Z98.890 OTHER SPECIFIED POSTPROCEDURAL STATES: Chronic | ICD-10-CM

## 2024-02-12 RX ORDER — OMEPRAZOLE 10 MG/1
1 CAPSULE, DELAYED RELEASE ORAL
Refills: 0 | DISCHARGE

## 2024-02-12 RX ORDER — LORATADINE 10 MG/1
1 TABLET ORAL
Refills: 0 | DISCHARGE

## 2024-02-12 RX ORDER — LEVOTHYROXINE SODIUM 125 MCG
1 TABLET ORAL
Refills: 0 | DISCHARGE

## 2024-02-12 RX ORDER — ASPIRIN/CALCIUM CARB/MAGNESIUM 324 MG
1 TABLET ORAL
Refills: 0 | DISCHARGE

## 2024-02-12 RX ORDER — ERTUGLIFLOZIN 5 MG/1
1 TABLET, FILM COATED ORAL
Refills: 0 | DISCHARGE

## 2024-02-12 RX ORDER — CYANOCOBALAMIN 1000 UG/ML
1000 INJECTION INTRAMUSCULAR; SUBCUTANEOUS
Qty: 1 | Refills: 0 | Status: ACTIVE | COMMUNITY
Start: 2024-02-12 | End: 1900-01-01

## 2024-02-12 RX ORDER — INDOMETHACIN 50 MG
1 CAPSULE ORAL
Refills: 0 | DISCHARGE

## 2024-02-12 RX ORDER — ROSUVASTATIN CALCIUM 20 MG/1
20 TABLET, FILM COATED ORAL
Qty: 90 | Refills: 3 | Status: ACTIVE | COMMUNITY
Start: 2024-02-12 | End: 1900-01-01

## 2024-02-12 RX ORDER — CETIRIZINE HYDROCHLORIDE 10 MG/1
1 TABLET ORAL
Refills: 0 | DISCHARGE

## 2024-02-12 RX ORDER — INSULIN GLARGINE AND LIXISENATIDE 100; 33 U/ML; UG/ML
0 INJECTION, SOLUTION SUBCUTANEOUS
Refills: 0 | DISCHARGE

## 2024-02-12 RX ORDER — ROSUVASTATIN CALCIUM 5 MG/1
1 TABLET ORAL
Refills: 0 | DISCHARGE

## 2024-02-12 RX ORDER — ACETAMINOPHEN 500 MG
1 TABLET ORAL
Refills: 0 | DISCHARGE

## 2024-02-12 RX ORDER — OLMESARTAN MEDOXOMIL 5 MG/1
1 TABLET, FILM COATED ORAL
Refills: 0 | DISCHARGE

## 2024-02-12 RX ORDER — INSULIN LISPRO 100/ML
0 VIAL (ML) SUBCUTANEOUS
Refills: 0 | DISCHARGE

## 2024-02-12 RX ORDER — METFORMIN HYDROCHLORIDE 850 MG/1
4 TABLET ORAL
Refills: 0 | DISCHARGE

## 2024-02-12 RX ORDER — AMLODIPINE BESYLATE 2.5 MG/1
1 TABLET ORAL
Refills: 0 | DISCHARGE

## 2024-02-12 NOTE — H&P PST ADULT - ADDITIONAL PE
denies loose teeth or dentures  visualization of only the base of uvula- mallampati class 3 denies loose teeth or dentures  visualization of only the base of uvula- Mallampati class 3

## 2024-02-12 NOTE — H&P PST ADULT - HISTORY OF PRESENT ILLNESS
53 year old female, presents to [facility], with pre op diagnosis- invasive ductal carcinoma of right breast, for pre op evaluation prior to scheduled surgery- right breast lumpectomy with MAGSEED localization, right sentinel lymph node biopsy with Dr Vasquez. Patient reports of abnormal mammogram, reports she didnt do mammogram for last 5 years.

## 2024-02-12 NOTE — H&P PST ADULT - PROBLEM SELECTOR PLAN 1
Patient is tentatively scheduled for scheduled surgery- right breast lumpectomy with MAGSEED localization, right sentinel lymph node biopsy with Dr Vasquez.- 02/23/24.    Pre-op instructions provided. Pt given verbal and written instructions with teach back on chlorhexidine wash and patients own Prilosec Pt verbalized understanding with return demonstration.    Recent CBC BMP, A1C results in chart Patient is tentatively scheduled for scheduled surgery- right breast lumpectomy with MAGSEED localization, right sentinel lymph node biopsy with Dr Vasquez.- 02/23/24.    Pre-op instructions provided. Pt given verbal and written instructions with teach back on chlorhexidine wash and patients own Prilosec Pt verbalized understanding with return demonstration.    Recent CBC BMP, A1C results in chart  Instructed patient to hold indomethacin 7 days prior to surgery( patient takes it for arthritic pain)

## 2024-02-12 NOTE — H&P PST ADULT - NSICDXPASTSURGICALHX_GEN_ALL_CORE_FT
PAST SURGICAL HISTORY:  S/P colonoscopy     Status post laser lithotripsy of ureteral calculus     Status post spinal surgery

## 2024-02-12 NOTE — H&P PST ADULT - NSICDXPASTMEDICALHX_GEN_ALL_CORE_FT
PAST MEDICAL HISTORY:  Arthritis     Hyperlipidemia     Hypertension     Hypothyroidism     Kidney calculus     Lumbar herniated disc     Obesity     Seasonal allergies     Type 2 diabetes mellitus

## 2024-02-12 NOTE — H&P PST ADULT - NS SC CAGE ALCOHOL GUILTY ABOUT
Group Topic: BH Check-in/Symptom Rating    Date: 6/14/2023  Start Time: 0900  End Time: 0915  Facilitators: Leni Montelongo group    Method: Group  Attendance: Present  Participation: Minimal  Patient Response: Attentive  Mood: Anxious  Affect: Type: Anxious  Behavior/Socialization: Engaged  Task Performance: Needs clarification and Needs cueing  Patient Evaluation: Encouragement - needs prompts   Mood : 5  Goal: relax,let go of my negative which is piled up and work on my anger.       no

## 2024-02-22 ENCOUNTER — NON-APPOINTMENT (OUTPATIENT)
Age: 54
End: 2024-02-22

## 2024-02-22 ENCOUNTER — TRANSCRIPTION ENCOUNTER (OUTPATIENT)
Age: 54
End: 2024-02-22

## 2024-02-22 PROBLEM — N20.0 CALCULUS OF KIDNEY: Chronic | Status: ACTIVE | Noted: 2024-02-12

## 2024-02-22 PROBLEM — E11.9 TYPE 2 DIABETES MELLITUS WITHOUT COMPLICATIONS: Chronic | Status: ACTIVE | Noted: 2024-02-12

## 2024-02-22 PROBLEM — E78.5 HYPERLIPIDEMIA, UNSPECIFIED: Chronic | Status: ACTIVE | Noted: 2024-02-12

## 2024-02-22 PROBLEM — J30.2 OTHER SEASONAL ALLERGIC RHINITIS: Chronic | Status: ACTIVE | Noted: 2024-02-12

## 2024-02-22 PROBLEM — M19.90 UNSPECIFIED OSTEOARTHRITIS, UNSPECIFIED SITE: Chronic | Status: ACTIVE | Noted: 2024-02-12

## 2024-02-22 PROBLEM — M51.26 OTHER INTERVERTEBRAL DISC DISPLACEMENT, LUMBAR REGION: Chronic | Status: ACTIVE | Noted: 2024-02-12

## 2024-02-22 PROBLEM — E03.9 HYPOTHYROIDISM, UNSPECIFIED: Chronic | Status: ACTIVE | Noted: 2024-02-12

## 2024-02-23 ENCOUNTER — TRANSCRIPTION ENCOUNTER (OUTPATIENT)
Age: 54
End: 2024-02-23

## 2024-02-23 ENCOUNTER — OUTPATIENT (OUTPATIENT)
Dept: OUTPATIENT SERVICES | Facility: HOSPITAL | Age: 54
LOS: 1 days | End: 2024-02-23
Payer: COMMERCIAL

## 2024-02-23 ENCOUNTER — OUTPATIENT (OUTPATIENT)
Dept: OUTPATIENT SERVICES | Facility: HOSPITAL | Age: 54
LOS: 1 days | Discharge: HOME | End: 2024-02-23
Payer: COMMERCIAL

## 2024-02-23 ENCOUNTER — RESULT REVIEW (OUTPATIENT)
Age: 54
End: 2024-02-23

## 2024-02-23 ENCOUNTER — APPOINTMENT (OUTPATIENT)
Dept: MAMMOGRAPHY | Facility: IMAGING CENTER | Age: 54
End: 2024-02-23
Payer: COMMERCIAL

## 2024-02-23 ENCOUNTER — APPOINTMENT (OUTPATIENT)
Dept: NUCLEAR MEDICINE | Facility: IMAGING CENTER | Age: 54
End: 2024-02-23

## 2024-02-23 ENCOUNTER — APPOINTMENT (OUTPATIENT)
Dept: SURGICAL ONCOLOGY | Facility: AMBULATORY SURGERY CENTER | Age: 54
End: 2024-02-23

## 2024-02-23 VITALS
RESPIRATION RATE: 21 BRPM | TEMPERATURE: 98 F | OXYGEN SATURATION: 98 % | SYSTOLIC BLOOD PRESSURE: 118 MMHG | HEART RATE: 90 BPM | DIASTOLIC BLOOD PRESSURE: 68 MMHG

## 2024-02-23 VITALS
WEIGHT: 211.64 LBS | SYSTOLIC BLOOD PRESSURE: 118 MMHG | HEART RATE: 86 BPM | DIASTOLIC BLOOD PRESSURE: 78 MMHG | RESPIRATION RATE: 16 BRPM | OXYGEN SATURATION: 100 % | TEMPERATURE: 98 F

## 2024-02-23 DIAGNOSIS — Z98.890 OTHER SPECIFIED POSTPROCEDURAL STATES: Chronic | ICD-10-CM

## 2024-02-23 DIAGNOSIS — C50.911 MALIGNANT NEOPLASM OF UNSPECIFIED SITE OF RIGHT FEMALE BREAST: ICD-10-CM

## 2024-02-23 DIAGNOSIS — Z00.8 ENCOUNTER FOR OTHER GENERAL EXAMINATION: ICD-10-CM

## 2024-02-23 PROBLEM — I10 ESSENTIAL (PRIMARY) HYPERTENSION: Chronic | Status: ACTIVE | Noted: 2024-02-12

## 2024-02-23 PROBLEM — E66.9 OBESITY, UNSPECIFIED: Chronic | Status: ACTIVE | Noted: 2024-02-12

## 2024-02-23 LAB — GLUCOSE BLDC GLUCOMTR-MCNC: 79 MG/DL — SIGNIFICANT CHANGE UP (ref 70–99)

## 2024-02-23 PROCEDURE — 19301 PARTIAL MASTECTOMY: CPT | Mod: RT

## 2024-02-23 PROCEDURE — 76098 X-RAY EXAM SURGICAL SPECIMEN: CPT | Mod: 26

## 2024-02-23 PROCEDURE — 38525 BIOPSY/REMOVAL LYMPH NODES: CPT | Mod: RT

## 2024-02-23 PROCEDURE — 38792 RA TRACER ID OF SENTINL NODE: CPT | Mod: 59,RT

## 2024-02-23 PROCEDURE — 38900 IO MAP OF SENT LYMPH NODE: CPT | Mod: RT

## 2024-02-23 PROCEDURE — 76098 X-RAY EXAM SURGICAL SPECIMEN: CPT

## 2024-02-23 NOTE — ASU DISCHARGE PLAN (ADULT/PEDIATRIC) - PROCEDURE
Right Partial Mastectomy and [place] Lymph Node Biopsy Right Partial Mastectomy and [place] Lymph Node Biopsy

## 2024-02-23 NOTE — BRIEF OPERATIVE NOTE - NSICDXBRIEFPROCEDURE_GEN_ALL_CORE_FT
PROCEDURES:  Right breast lumpectomy 23-Feb-2024 15:49:11  Estephania Ac  [place] node mapping with biopsy 23-Feb-2024 15:49:17  Estephania Ac

## 2024-02-23 NOTE — ASU DISCHARGE PLAN (ADULT/PEDIATRIC) - NS MD DC FALL RISK RISK
For information on Fall & Injury Prevention, visit: [email or web address] OR  [email or web address] OR  https://www.cdc.gov/steadi/patient.html

## 2024-02-23 NOTE — ASU DISCHARGE PLAN (ADULT/PEDIATRIC) - CARE PROVIDER_API CALL
Pauly Vasquez)  Surgery  [address] [place] [id number]  Phone: [phone]  Fax: [phone]  Established Patient  Follow Up Time:

## 2024-02-23 NOTE — ASU PREOPERATIVE ASSESSMENT, ADULT ([facility]ARK ONLY) - TEACHING/LEARNING FACTORS IMPACT ABILITY TO LEARN
Pt requiring CPAP support due to grunting and retractions. Placed in heated transport isolette on BABITA cannula CPAP at 30% with sats 97%. Admitted to NICU Room 1, placed on heated RHW, attached to Vent on CPAP +6. NNP, RN x 2, and RT at bedside.   none

## 2024-02-23 NOTE — ASU PREOPERATIVE ASSESSMENT, ADULT ([facility] ONLY) - FALL HARM RISK - UNIVERSAL INTERVENTIONS
Bed in lowest position, wheels locked, appropriate side rails in place/Call bell, personal items and telephone in reach/Instruct patient to call for assistance before getting out of bed or chair/Non-slip footwear when patient is out of bed/[place] to call system/Physically safe environment - no spills, clutter or unnecessary equipment/Purposeful Proactive Rounding/Room/bathroom lighting operational, light cord in reach

## 2024-02-23 NOTE — ASU PREOP CHECKLIST - LATEX ALLERGY
no
Principal Discharge DX:	Postpartum state  Goal:	Feel well!  Assessment and plan of treatment:	Vaginal delivery, meeting all postpartum milestones.  Please follow-up with your OB doctor within 6 weeks.  You can resume a regular diet at home and may continue your prenatal vitamins as directed.  Please place nothing in the vagina for 6 weeks (no tampons, sex, douching, tub baths, swimming pools, etc).  If you have severe headaches and/or vision changes, heavy bleeding, or chest pain, please call your provider or go to the nearest Emergency Department.  Please call your OB with any signs of symptoms of infection including fever > 100.4 degrees, severe pain, malodorous vaginal discharge or heavy bleeding requiring more than 1-2 pads/hour.  You can take Motrin 600mg orally every 6 hours for pain as needed.

## 2024-02-26 ENCOUNTER — NON-APPOINTMENT (OUTPATIENT)
Age: 54
End: 2024-02-26

## 2024-03-01 LAB — SURGICAL PATHOLOGY STUDY: SIGNIFICANT CHANGE UP

## 2024-03-06 ENCOUNTER — NON-APPOINTMENT (OUTPATIENT)
Age: 54
End: 2024-03-06

## 2024-03-07 ENCOUNTER — APPOINTMENT (OUTPATIENT)
Dept: SURGICAL ONCOLOGY | Facility: CLINIC | Age: 54
End: 2024-03-07
Payer: COMMERCIAL

## 2024-03-07 VITALS
HEART RATE: 93 BPM | DIASTOLIC BLOOD PRESSURE: 98 MMHG | WEIGHT: 206 LBS | BODY MASS INDEX: 35.17 KG/M2 | HEIGHT: 64 IN | SYSTOLIC BLOOD PRESSURE: 146 MMHG | OXYGEN SATURATION: 99 %

## 2024-03-07 PROCEDURE — 99024 POSTOP FOLLOW-UP VISIT: CPT

## 2024-03-08 ENCOUNTER — OUTPATIENT (OUTPATIENT)
Dept: OUTPATIENT SERVICES | Facility: HOSPITAL | Age: 54
LOS: 1 days | Discharge: HOME | End: 2024-03-08
Payer: COMMERCIAL

## 2024-03-08 DIAGNOSIS — Z98.890 OTHER SPECIFIED POSTPROCEDURAL STATES: Chronic | ICD-10-CM

## 2024-03-11 ENCOUNTER — NON-APPOINTMENT (OUTPATIENT)
Age: 54
End: 2024-03-11

## 2024-03-12 ENCOUNTER — NON-APPOINTMENT (OUTPATIENT)
Age: 54
End: 2024-03-12

## 2024-03-13 ENCOUNTER — NON-APPOINTMENT (OUTPATIENT)
Age: 54
End: 2024-03-13

## 2024-03-17 ENCOUNTER — NON-APPOINTMENT (OUTPATIENT)
Age: 54
End: 2024-03-17

## 2024-03-18 ENCOUNTER — RX RENEWAL (OUTPATIENT)
Age: 54
End: 2024-03-18

## 2024-03-18 ENCOUNTER — APPOINTMENT (OUTPATIENT)
Dept: RADIATION ONCOLOGY | Facility: CLINIC | Age: 54
End: 2024-03-18
Payer: COMMERCIAL

## 2024-03-18 VITALS
OXYGEN SATURATION: 98 % | HEART RATE: 79 BPM | SYSTOLIC BLOOD PRESSURE: 160 MMHG | HEIGHT: 64 IN | TEMPERATURE: 97.16 F | BODY MASS INDEX: 36.19 KG/M2 | DIASTOLIC BLOOD PRESSURE: 94 MMHG | RESPIRATION RATE: 17 BRPM | WEIGHT: 212 LBS

## 2024-03-18 DIAGNOSIS — Z80.8 FAMILY HISTORY OF MALIGNANT NEOPLASM OF OTHER ORGANS OR SYSTEMS: ICD-10-CM

## 2024-03-18 DIAGNOSIS — Z23 ENCOUNTER FOR IMMUNIZATION: ICD-10-CM

## 2024-03-18 DIAGNOSIS — U07.1 COVID-19: ICD-10-CM

## 2024-03-18 DIAGNOSIS — M19.90 UNSPECIFIED OSTEOARTHRITIS, UNSPECIFIED SITE: ICD-10-CM

## 2024-03-18 PROCEDURE — 99204 OFFICE O/P NEW MOD 45 MIN: CPT

## 2024-03-18 RX ORDER — ACETAMINOPHEN ER 650 MG TABLET,EXTENDED RELEASE 650 MG
TABLET, EXTENDED RELEASE ORAL
Refills: 0 | Status: ACTIVE | COMMUNITY

## 2024-03-18 RX ORDER — FLUTICASONE PROPIONATE 50 UG/1
50 SPRAY, METERED NASAL
Refills: 0 | Status: ACTIVE | COMMUNITY

## 2024-03-18 RX ORDER — CHLORHEXIDINE GLUCONATE 4 %
5 LIQUID (ML) TOPICAL
Refills: 0 | Status: ACTIVE | COMMUNITY

## 2024-03-18 RX ORDER — CHROMIUM 200 MCG
TABLET ORAL
Refills: 0 | Status: ACTIVE | COMMUNITY

## 2024-03-18 RX ORDER — ASPIRIN 81 MG
81 TABLET, DELAYED RELEASE (ENTERIC COATED) ORAL
Refills: 0 | Status: ACTIVE | COMMUNITY

## 2024-03-18 RX ORDER — MULTIVIT-MIN/IRON/FOLIC ACID/K 18-600-40
CAPSULE ORAL
Refills: 0 | Status: ACTIVE | COMMUNITY

## 2024-03-18 RX ORDER — OMEPRAZOLE 40 MG/1
40 CAPSULE, DELAYED RELEASE ORAL
Refills: 0 | Status: ACTIVE | COMMUNITY

## 2024-03-18 RX ORDER — PSEUDOEPHEDRINE HCL 30 MG
TABLET ORAL
Refills: 0 | Status: ACTIVE | COMMUNITY

## 2024-03-18 RX ORDER — CALCIUM CARBONATE/VITAMIN D3 600 MG-10
TABLET ORAL
Refills: 0 | Status: ACTIVE | COMMUNITY

## 2024-03-18 RX ORDER — ROSUVASTATIN CALCIUM 10 MG/1
10 TABLET, FILM COATED ORAL DAILY
Qty: 90 | Refills: 3 | Status: DISCONTINUED | COMMUNITY
Start: 2018-06-30 | End: 2024-03-18

## 2024-03-18 RX ORDER — VITAMIN B COMPLEX
CAPSULE ORAL
Refills: 0 | Status: ACTIVE | COMMUNITY

## 2024-03-18 RX ORDER — INSULIN GLARGINE AND LIXISENATIDE 100; 33 U/ML; UG/ML
100-33 INJECTION, SOLUTION SUBCUTANEOUS
Qty: 225 | Refills: 3 | Status: ACTIVE | COMMUNITY
Start: 2019-07-26 | End: 1900-01-01

## 2024-03-18 RX ORDER — CETIRIZINE HCL 10 MG
TABLET ORAL
Refills: 0 | Status: ACTIVE | COMMUNITY

## 2024-03-18 RX ORDER — LORAZEPAM 1 MG/1
1 TABLET ORAL
Qty: 2 | Refills: 0 | Status: DISCONTINUED | COMMUNITY
Start: 2024-01-08 | End: 2024-03-18

## 2024-03-18 NOTE — PHYSICAL EXAM
[Sclera] : the sclera and conjunctiva were normal [Obese] : obese [Outer Ear] : the ears and nose were normal in appearance [No UE Edema] : there is no upper extremity edema [Normal] : no focal deficits [de-identified] : right breast periareolar and axillary scars healed well [de-identified] : BMI 36

## 2024-03-18 NOTE — REVIEW OF SYSTEMS
[Patient Intake Form Reviewed] : Patient intake form was reviewed [Dry Eyes] : dryness of the eyes [Negative] : Heme/Lymph [FreeTextEntry3] : wears glasses, dry eyes with allergies  [FreeTextEntry8] : history of spastic bladder  [de-identified] : healed right breast/axilla surgical scars, history of psoriasis  [FreeTextEntry9] : some joint aches  [de-identified] : peripheral neuropathy

## 2024-03-18 NOTE — HISTORY OF PRESENT ILLNESS
[FreeTextEntry1] : Ms. LIZZY PEÑA is a 53-year-old woman, who presents for evaluation of R breast IDC, stage IA ER 95%, OK 95%, HER2 negative pT1c, pN0 s/p R lumpectomy & SLNB 2/23/2024.  She is unaccompanied for today's visit.   Diagnosis: pT1c pN0, IDC of the RIGHT breast, stage IA, G2. Two nodes negative (0/2). ER/OK positive, HER2 negative Oncotype DX Recurrence Score: Pending   Oncologic history: Patient went for mammogram in November 2023. She did not have any breast complaints at that time--denies any masses, skin changes, nipple discharge. 11/21/2023 Mammogram showed a 0.7cm irregular nodule in the upper outer portion of the right breast. L neg. BIRADS 4 11/21/2023: Bilateral breast US: suspicious nodule in the area of mammographic concern upper outer right breast. US guided biopsy of the right breast nodule is recommended.  12/6/2023 RIGHT breast US guided biopsy: [facility] Slide Review: Invasive well to moderately differentiated ductal carcinoma, measuring 0.7cm in greatest dimension. No lymphovascular permeation by tumor seen.  ER >95%, OK >95%, HER2 negative.   1/9/2024 MRI: Susceptibility artifact from a bx clip in seen in the R mid/posterior UOQ associated w/ a 2.5 x 1.0 cm spiculated enhancing mass corresponding to known malignancy; mass is 1.7 cm away from the dermis (where there is no overlying suspicious enhancement) L neg 2-3 slightly borderline prominent B/L axillary LN w/ minimal cortical thickening w/ a representative LN in the R axilla measuring up to 2.0 x 1.3 cm and a representative LN in the L axilla measuring 1.9 x 1.2 cm, no significant internal mammary LAD BR6  2/24/2023 S/p RIGHT lumpectomy with SLNB with Dr. Vasquez (surgeon) Invasive moderately differentiated ductal carcinoma measuring 11mm, G2, Negative margins, no LVI, no DCIS identified, 0/2 nodes positive. ER 95%, OK 95%, HER 2 0%. pT1cN0, [id number]  3/18/24 - presents in consultation for discussion of radiation therapy options. Ms. Peña is feeling well today and has healed well from her surgery.  She looks forward to next steps in her treatment. Oncotype Dx score is still pending, ETA 3/27/24 and she is to see Dr. Hernandez ([facility]) on 3/29/24.

## 2024-03-18 NOTE — VITALS
[Maximal Pain Intensity: 0/10] : 0/10 [Least Pain Intensity: 0/10] : 0/10 [NoTreatment Scheduled] : no treatment scheduled [90: Able to carry normal activity; minor signs or symptoms of disease.] : 90: Able to carry normal activity; minor signs or symptoms of disease.  [ECOG Performance Status: 0 - Fully active, able to carry on all pre-disease performance without restriction] : Performance Status: 0 - Fully active, able to carry on all pre-disease performance without restriction [Date: ____________] : Patient's last distress assessment performed on [unfilled]. [Patient given social work contact information and resource sheet] : Patient was given social work contact information and resource sheet [5 - Distress Level] : Distress Level: 5

## 2024-03-19 ENCOUNTER — OUTPATIENT (OUTPATIENT)
Dept: OUTPATIENT SERVICES | Facility: HOSPITAL | Age: 54
LOS: 1 days | Discharge: HOME | End: 2024-03-19

## 2024-03-19 DIAGNOSIS — D05.11 INTRADUCTAL CARCINOMA IN SITU OF RIGHT BREAST: ICD-10-CM

## 2024-03-19 DIAGNOSIS — Z98.890 OTHER SPECIFIED POSTPROCEDURAL STATES: Chronic | ICD-10-CM

## 2024-03-20 NOTE — CONSULT LETTER
[Consult Letter:] : I had the pleasure of evaluating your patient, [unfilled]. [Dear  ___] : Dear  [unfilled], [Please see my note below.] : Please see my note below. [Sincerely,] : Sincerely, [Consult Closing:] : Thank you very much for allowing me to participate in the care of this patient.  If you have any questions, please do not hesitate to contact me. [DrKulwinder  ___] : Dr. RUIZ [Courtesy Letter:] : I had the pleasure of seeing your patient, [unfilled], in my office today. [FreeTextEntry2] : Jaye Zavaleta MD [FreeTextEntry3] : Pauly Vasquez MD Breast Surgeon Division of Surgical Oncology Department of Surgery [address] [place] [address] Tel: [phone] Fax: [phone] Email: [email or web address]

## 2024-03-20 NOTE — PAST MEDICAL HISTORY
[Perimenopausal] : The patient is perimenopausal [Menarche Age ____] : age at menarche was [unfilled] [Approximately ___] : the LMP was approximately [unfilled] [Total Preg ___] : G[unfilled] [Irregular Cycle Intervals] : are  irregular [History of Hormone Replacement Treatment] : has no history of hormone replacement treatment [FreeTextEntry6] : none [FreeTextEntry7] : <1 yr [FreeTextEntry8] : n/a

## 2024-03-20 NOTE — PHYSICAL EXAM
[Normocephalic] : normocephalic [Atraumatic] : atraumatic [EOMI] : extra ocular movement intact [PERRL] : pupils equal, round and reactive to light [Sclera nonicteric] : sclera nonicteric [Supple] : supple [No Supraclavicular Adenopathy] : no supraclavicular adenopathy [Examined in the supine and seated position] : examined in the supine and seated position [Bra Size: ___] : Bra Size: [unfilled] [Symmetrical] : symmetrical [No dominant masses] : no dominant masses in right breast  [No dominant masses] : no dominant masses left breast [No Nipple Retraction] : no left nipple retraction [No Nipple Discharge] : no left nipple discharge [Breast Mass Left Breast ___cm] : no masses [No Axillary Lymphadenopathy] : no left axillary lymphadenopathy [No Edema] : no edema [No Rashes] : no rashes [No Ulceration] : no ulceration [Breast Nipple Inversion] : nipples not inverted [Breast Nipple Flattening] : nipples not flattened [Breast Nipple Retraction] : nipples not retracted [Breast Abnormal Lactation (Galactorrhea)] : no galactorrhea [Breast Nipple Fissures] : nipples not fissured [Breast Abnormal Secretion Bloody Fluid] : no bloody discharge [Breast Abnormal Secretion Serous Fluid] : no serous discharge [Breast Abnormal Secretion Opalescent Fluid] : no milky discharge [de-identified] : non-labored respirations  [de-identified] : lateral circumareolar incision c/d/i, no seroma, hematoma, erythema [de-identified] : incision c/d/i, no seroma, erythema, ecchymosis

## 2024-03-20 NOTE — ASSESSMENT
[FreeTextEntry1] : Ms. LIZZY EDWARD is a 53 year old woman, referred for consultation by Dr. Jaye Zavaleta for Right breast IDC, 7 mm, G1, ER>95%, NE>95%, HER2 0, cT1bN0, stage IA   2/23/2024 R lumpectomy & SLNB - R breast IDC, mod differentiated, G2, 1.1 cm in greatest dimension, margins negative - R SLNB (0/2) negative  - ER 95%, NE 95%, HER2 negative - pT1cN0, AJCC IA  Incisions healing well, no signs of infection, hematoma, or seroma.  Plan: - ODx pending - refer to med onc & rad onc - Next imaging 11/2024 - RTO 3 months

## 2024-03-20 NOTE — HISTORY OF PRESENT ILLNESS
[FreeTextEntry1] : Ms. LIZZY EDWARD is a 53 year old woman, referred by Dr. Jaye Zavaleta for evaluation of R breast IDC, s/p R lumpectomy & SLNB 2024, here for a post-op visit.   Prior history: The patient reports that she has had only 2 or 3 mammograms throughout her life. Her last mammogram was approximately 5 years ago. She has never had any abnormal findings, nor any breast biopsies. She has been encouraged to resume mammography for some time now, and she went for imaging in November. She did not have any breast complaints at that time--denies any masses, skin changes, nipple discharge.  Recent imagin2023 B/L SM (ZP) revealed SFGD -R UOQ 7 mm irregular nodule (also seen on same day US) -L neg  -BR4  2023 B/L US (ZP) -R 10:00 N8 7 mm nodule (corresponding to finding on mammo)--> f/u R USG-CNB -R 11:00 sub centimeter cyst -L neg -BR4  2023 R USG-CNB (ZP) R 10:00 N8 (straight): IDC (G1), ER >95%, NH >95%, HER2 0  PMH:  HTN, HLD, DM, hypothyroid, arthritis PSH:  kidney stone surgery, back surgery Meds:  Synthroid, Benicar, insulin, amlodipine, Crestor, Prilosec, metformin, ASA 81, Steglatro, NSAIDS PRN, Steroids PRN for arthritis ALL:  Denies SH:  No tobacco. Rare EtOH FH: Mat GF skin cancer. Mother breast cancer age 75. GYN: Menarche 11. LMP within 1 year, irregular. . OCP <1 year. Fertility none. HRT none  2023 [facility] genetic panel, negative  2024 MRI ([facility]) - susceptibility artifact from a bx clip in seen in the R mid/posterior UOQ associated w/ a 2.5 x 1.0 cm spiculated enhancing mass corresponding to known malignancy; mass is 1.7 cm away from the dermis (where there is no overlying suspicious enhancement) - L neg - 2-3 slightly borderline prominent B/L axillary LN w/ minimal cortical thickening w/ a representative LN in the R axilla measuring up to 2.0 x 1.3 cm and a representative LN in the L axilla measuring 1.9 x 1.2 cm, no significant internal mammary LAD - BR6  2024 R lumpectomy & SLNB - R breast IDC, mod differentiated, G2, 1.1 cm in greatest dimension, margins negative - R SLNB (0/2) negative  - ER 95%, NH 95%, HER2 negative - pT1cN0, AJCC IA  Interval History (post op) The patient reports more pain in her axilla than in her breast. Incision sits at the seam of her bra--feels that bra rubs on the area which is uncomfortable. Pain is improving however. Denies fevers/chills.

## 2024-03-22 ENCOUNTER — NON-APPOINTMENT (OUTPATIENT)
Age: 54
End: 2024-03-22

## 2024-03-25 ENCOUNTER — TRANSCRIPTION ENCOUNTER (OUTPATIENT)
Age: 54
End: 2024-03-25

## 2024-03-25 ENCOUNTER — APPOINTMENT (OUTPATIENT)
Dept: HEMATOLOGY ONCOLOGY | Facility: CLINIC | Age: 54
End: 2024-03-25
Payer: COMMERCIAL

## 2024-03-25 VITALS
HEART RATE: 89 BPM | BODY MASS INDEX: 35.95 KG/M2 | HEIGHT: 64.57 IN | WEIGHT: 213.16 LBS | DIASTOLIC BLOOD PRESSURE: 81 MMHG | TEMPERATURE: 206.96 F | RESPIRATION RATE: 16 BRPM | SYSTOLIC BLOOD PRESSURE: 133 MMHG | OXYGEN SATURATION: 98 %

## 2024-03-25 PROCEDURE — 99205 OFFICE O/P NEW HI 60 MIN: CPT

## 2024-03-26 ENCOUNTER — NON-APPOINTMENT (OUTPATIENT)
Age: 54
End: 2024-03-26

## 2024-03-27 NOTE — HISTORY OF PRESENT ILLNESS
[de-identified] : Age 53: right breast cancer Screen detected: she had mammogram done 11/21/2023 which showed 0.7 cm irregular nodule in the upper outer portion in the R breast. She had u/s guided R breast 10:00 biopsy which showed invasive well to moderately differentiated ductal carcinoma measuring at least 7 mm that was ER >95%, WI >95%, Her2 negative (0). She had MRI of the breast done at 1/11/2024 which showed 2.5 cm enhancing mass corresponding to known malignancy in the upper outer right breast and slightly 2 to 3 borderline prominent bilateral axillary lymph nodes. On 2/23/2024, she underwent right lumpectomy with sentinel lymph node biopsy with Dr Vasquez. The pathology showed right invasive moderately differentiated ductal carcinoma measuring 11 mm, SBR 6-7/9, and negative sentinel lymph nodes (0/2). OncotypeDx score was 20 which indicated 9 year risk of recurrence at 6% with AI or tamoxifen alone.  [de-identified] :  invasive moderately differentiated ductal carcinoma  ER >95%, CT >95%, Her2 negative [de-identified] : She is present to review adjuvant recommendations. She has not had any spotting or periods for  1 1/2 years. Prior to that her periods were irregular (every 3 to 6 months) x 2 1/2 years. She feels hot most of the time but denies any intolerable hot flashes. She tolerated surgery well. Has diffuse arthritic pain and takes indomethacin which helps with pain.

## 2024-03-27 NOTE — ASSESSMENT
[FreeTextEntry1] : 52 y/o  postmenopausal F with right breast Stage I (T1N0) invasive ductal carcinoma that is ER >95%, VA >95%, Her2 negative s/p lumpectomy and SLNB. We reviewed the significance of ER positive, LN negative breast cancer and the risk of breast cancer recurrence. We reviewed the role of adjuvant therapy to reduce the risk of breast cancer recurrence. We reviewed the use of OncotypeDx to evaluate the benefit of adjuvant chemotherapy. With a low intermediate OncotypeDx score of 20, we explained the risks of chemotherapy may outweigh any benefits. We recommended endocrine therapy. We reviewed the different endocrine options. We reviewed blood work done by her PCP in January indicating postmenopausal range. We reviewed anastrozole one tablet a day for up to 5 to 7 years. We reviewed the potential side effects of endocrine therapy including but not limited to: hot flash, GI upset, bone stiffness/ arthralgias, fatigue, and decrease in bone density. Last bone density 2023: WNL. We reviewed supportive measures to decrease these side effects. We reviewed bone health with calcium and Vitamin D supplementation. Written information on anastrozole was provided to her. Questions were answered to her satisfaction. She understands if she experiences any intolerable side effects, we could switch endocrine therapies. She consented to anastrozole to start after RT if RT is recommended. She understands if she has any vaginal spotting indicating she is still perimenopausal: she will contact us as well.  Her next follow up will be in 3 months.  [facility] paperwork completed.

## 2024-03-27 NOTE — PHYSICAL EXAM
[de-identified] : right breast lumpectomy and SLNB healed; no abnl masses palpable  [de-identified] : no reproducible pain on palpation of C/T/ L/S spine

## 2024-03-27 NOTE — REVIEW OF SYSTEMS
[Joint Pain] : joint pain [Joint Stiffness] : no joint stiffness [Muscle Pain] : no muscle pain [Proptosis] : no proptosis [Muscle Weakness] : no muscle weakness [Hot Flashes] : no hot flashes [Deepening Of The Voice] : no deepening of the voice [de-identified] : feels hot most of the time  [FreeTextEntry9] : arthritis

## 2024-03-27 NOTE — CONSULT LETTER
[Dear  ___] : Dear  [unfilled], [Consult Letter:] : I had the pleasure of evaluating your patient, [unfilled]. [( Thank you for referring [unfilled] for consultation for _____ )] : Thank you for referring [unfilled] for consultation for [unfilled] [Please see my note below.] : Please see my note below. [Consult Closing:] : Thank you very much for allowing me to participate in the care of this patient.  If you have any questions, please do not hesitate to contact me. [Sincerely,] : Sincerely, [DrKulwinder  ___] : Dr. RUIZ [DrKulwinder ___] : Dr. RUIZ [FreeTextEntry2] : [place] [address] [FreeTextEntry3] : Robby Hernandez MD Attending [facility]

## 2024-03-29 DIAGNOSIS — E53.8 DEFICIENCY OF OTHER SPECIFIED B GROUP VITAMINS: ICD-10-CM

## 2024-04-01 ENCOUNTER — TRANSCRIPTION ENCOUNTER (OUTPATIENT)
Age: 54
End: 2024-04-01

## 2024-04-01 ENCOUNTER — NON-APPOINTMENT (OUTPATIENT)
Age: 54
End: 2024-04-01

## 2024-04-01 DIAGNOSIS — E78.5 HYPERLIPIDEMIA, UNSPECIFIED: ICD-10-CM

## 2024-04-01 DIAGNOSIS — C50.411 MALIGNANT NEOPLASM OF UPPER-OUTER QUADRANT OF RIGHT FEMALE BREAST: ICD-10-CM

## 2024-04-01 DIAGNOSIS — E11.9 TYPE 2 DIABETES MELLITUS W/OUT COMPLICATIONS: ICD-10-CM

## 2024-04-01 PROCEDURE — 77263 THER RADIOLOGY TX PLNG CPLX: CPT

## 2024-04-01 PROCEDURE — 77333 RADIATION TREATMENT AID(S): CPT | Mod: 26

## 2024-04-04 ENCOUNTER — TRANSCRIPTION ENCOUNTER (OUTPATIENT)
Age: 54
End: 2024-04-04

## 2024-04-04 RX ORDER — INDOMETHACIN 50 MG/1
50 CAPSULE ORAL 3 TIMES DAILY
Qty: 270 | Refills: 0 | Status: ACTIVE | COMMUNITY
Start: 2024-01-22 | End: 1900-01-01

## 2024-04-05 ENCOUNTER — TRANSCRIPTION ENCOUNTER (OUTPATIENT)
Age: 54
End: 2024-04-05

## 2024-04-05 RX ORDER — AMLODIPINE BESYLATE 5 MG/1
5 TABLET ORAL DAILY
Qty: 90 | Refills: 1 | Status: ACTIVE | COMMUNITY
Start: 2019-05-31 | End: 1900-01-01

## 2024-04-12 PROCEDURE — 77301 RADIOTHERAPY DOSE PLAN IMRT: CPT | Mod: 26

## 2024-04-12 PROCEDURE — 77338 DESIGN MLC DEVICE FOR IMRT: CPT | Mod: 26

## 2024-04-12 PROCEDURE — 77300 RADIATION THERAPY DOSE PLAN: CPT | Mod: 26

## 2024-04-15 ENCOUNTER — TRANSCRIPTION ENCOUNTER (OUTPATIENT)
Age: 54
End: 2024-04-15

## 2024-04-22 PROCEDURE — 77280 THER RAD SIMULAJ FIELD SMPL: CPT | Mod: 26

## 2024-04-23 ENCOUNTER — NON-APPOINTMENT (OUTPATIENT)
Age: 54
End: 2024-04-23

## 2024-04-23 PROCEDURE — G6017: CPT

## 2024-04-23 PROCEDURE — 77427 RADIATION TX MANAGEMENT X5: CPT

## 2024-04-25 ENCOUNTER — NON-APPOINTMENT (OUTPATIENT)
Age: 54
End: 2024-04-25

## 2024-04-26 PROCEDURE — G6017: CPT

## 2024-04-29 ENCOUNTER — NON-APPOINTMENT (OUTPATIENT)
Age: 54
End: 2024-04-29

## 2024-04-29 NOTE — HISTORY OF PRESENT ILLNESS
Breast cyst, left     delivery delivered
[FreeTextEntry1] : Ms. LIZZY PEÑA is a 53-year-old woman, who presents for an on treatment visit.  She is unaccompanied for today's visit.   Diagnosis: pT1c pN0, IDC of the RIGHT breast, stage IA, G2. Two nodes negative (0/2). ER/CT positive, HER2 negative. Oncotype DX Recurrence Score: 20  Oncologic history: Patient went for mammogram in November 2023. She did not have any breast complaints at that time--denies any masses, skin changes, nipple discharge. 11/21/2023 Mammogram showed a 0.7cm irregular nodule in the upper outer portion of the right breast. L neg. BIRADS 4 11/21/2023: Bilateral breast US: suspicious nodule in the area of mammographic concern upper outer right breast. US guided biopsy of the right breast nodule is recommended.  12/6/2023 RIGHT breast US guided biopsy: [facility] Slide Review: Invasive well to moderately differentiated ductal carcinoma, measuring 0.7cm in greatest dimension. No lymphovascular permeation by tumor seen.  ER >95%, CT >95%, HER2 negative.   1/9/2024 MRI: Susceptibility artifact from a bx clip in seen in the R mid/posterior UOQ associated w/ a 2.5 x 1.0 cm spiculated enhancing mass corresponding to known malignancy; mass is 1.7 cm away from the dermis (where there is no overlying suspicious enhancement) L neg 2-3 slightly borderline prominent B/L axillary LN w/ minimal cortical thickening w/ a representative LN in the R axilla measuring up to 2.0 x 1.3 cm and a representative LN in the L axilla measuring 1.9 x 1.2 cm, no significant internal mammary LAD BR6  2/24/2023 S/p RIGHT lumpectomy with SLNB with Dr. Vasquez (surgeon) Invasive moderately differentiated ductal carcinoma measuring 11mm, G2, Negative margins, no LVI, no DCIS identified, 0/2 nodes positive. ER 95%, CT 95%, HER 2 0%. pT1cN0, [id number] Oncotype DX Recurrence Score: 20  3/18/24 - presented in consultation for discussion of radiation therapy options. Ms. Peña is feeling well today and has healed well from her surgery.  She looks forward to next steps in her treatment. Oncotype Dx score is still pending, ETA 3/27/24 and she is to see Dr. Hernandez ([facility]) on 3/29/24. Discussed the role of radiation in early breast cancer. We have discussed options for management, including participation in  trial, partial breast and whole breast irradiation and fractionation regimens. We have discussed the common and rarer acute and late side effects of adjuvant breast radiation as well of logistics of treatment, skin care and wellness during treatment. She has had written information to take away regarding radiation, and the trial. Once she has seen Dr Hernandez, we will finalize the treatment plan. If she were to have radiation, partial breast over 1-2 weeks would be planned.  3/25/24 - saw Dr. Hernandez ([facility]) in consultation ...... no chemotherapy indicated.  Recommend endocrine therapy, Anastrozole, to start after completion of radiation therapy.    4/23/24 - started on RADIATION Therapy to RIGHT Breast, 2600 cGy in 5 fx   4/29/24 - OTV 5/5 fx completed. Ms. Peña is tolerating treatment well. She has been moisturizing her skin with Aquaphor and Aloe at least once a day, reinforced that she should try to do it at least twice a day.  Skin with some pinkness, denies any pain.  Reinforced what to expect with radiation therapy, possible side effects, as well as skin care.  Discharge instructions/folder given and reviewed. Post treatment evaluation appointment scheduled for 6/10/24. She will follow up with Dr. Hernandez ([facility]) about starting Anastrozole.

## 2024-05-05 ENCOUNTER — NON-APPOINTMENT (OUTPATIENT)
Age: 54
End: 2024-05-05

## 2024-05-06 ENCOUNTER — APPOINTMENT (OUTPATIENT)
Dept: ENDOCRINOLOGY | Facility: CLINIC | Age: 54
End: 2024-05-06
Payer: COMMERCIAL

## 2024-05-06 VITALS
BODY MASS INDEX: 35.82 KG/M2 | SYSTOLIC BLOOD PRESSURE: 144 MMHG | OXYGEN SATURATION: 97 % | WEIGHT: 215 LBS | DIASTOLIC BLOOD PRESSURE: 82 MMHG | HEART RATE: 94 BPM | RESPIRATION RATE: 18 BRPM | TEMPERATURE: 208.04 F | HEIGHT: 65 IN

## 2024-05-06 DIAGNOSIS — E66.9 OBESITY, UNSPECIFIED: ICD-10-CM

## 2024-05-06 DIAGNOSIS — E03.9 HYPOTHYROIDISM, UNSPECIFIED: ICD-10-CM

## 2024-05-06 DIAGNOSIS — E11.65 TYPE 2 DIABETES MELLITUS WITH HYPERGLYCEMIA: ICD-10-CM

## 2024-05-06 DIAGNOSIS — I10 ESSENTIAL (PRIMARY) HYPERTENSION: ICD-10-CM

## 2024-05-06 LAB — GLUCOSE BLDC GLUCOMTR-MCNC: 224

## 2024-05-06 PROCEDURE — 99214 OFFICE O/P EST MOD 30 MIN: CPT | Mod: 25

## 2024-05-06 PROCEDURE — 95251 CONT GLUC MNTR ANALYSIS I&R: CPT

## 2024-05-06 RX ORDER — BOLUS INSULIN PUMP, 200 UNIT 2 UNIT
EACH MISCELLANEOUS
Qty: 3 | Refills: 3 | Status: ACTIVE | COMMUNITY
Start: 2024-05-06 | End: 1900-01-01

## 2024-05-06 RX ORDER — INSULIN DEGLUDEC INJECTION 200 U/ML
200 INJECTION, SOLUTION SUBCUTANEOUS
Qty: 5 | Refills: 2 | Status: ACTIVE | COMMUNITY
Start: 2022-08-31 | End: 1900-01-01

## 2024-05-06 RX ORDER — DIABETIC SUPPLIES,MISCELL
MISCELLANEOUS MISCELLANEOUS
Qty: 1 | Refills: 0 | Status: ACTIVE | COMMUNITY
Start: 2024-05-06 | End: 1900-01-01

## 2024-05-06 RX ORDER — TIRZEPATIDE 2.5 MG/.5ML
2.5 INJECTION, SOLUTION SUBCUTANEOUS
Qty: 3 | Refills: 1 | Status: ACTIVE | COMMUNITY
Start: 2022-08-31 | End: 1900-01-01

## 2024-05-06 NOTE — ASSESSMENT
[Diabetes Foot Care] : diabetes foot care [Long Term Vascular Complications] : long term vascular complications of diabetes [Carbohydrate Consistent Diet] : carbohydrate consistent diet [Importance of Diet and Exercise] : importance of diet and exercise to improve glycemic control, achieve weight loss and improve cardiovascular health [Exercise/Effect on Glucose] : exercise/effect on glucose [Hypoglycemia Management] : hypoglycemia management [Glucagon Use] : glucagon use [Ketone Testing] : ketone testing [Action and use of Insulin] : action and use of short and long-acting insulin [Self Monitoring of Blood Glucose] : self monitoring of blood glucose [Insulin Self-Administration] : insulin self-administration [Injection Technique, Storage, Sharps Disposal] : injection technique, storage, and sharps disposal [Sick-Day Management] : sick-day management [Retinopathy Screening] : Patient was referred to ophthalmology for retinopathy screening [Diabetic Medications] : Risks and benefits of diabetic medications were discussed [FreeTextEntry1] : 1. DM2, uncontrolled - intolerant of high dose of Mounjaro, but she wants to retry a 2.5 mg dose - will d/c Soliqua and start Tresiba 56 un qd and uptitrate - metformin er 500 mg 4 tabs with dinner, steglatro 15 mg qd - start CeQur patch (clicks) 3-3-6 - continue Benicar Crestor - we discussed Plenity to help with weight loss, but she wants to focus on the diet first  2. Hypothyroidism -  Synthroid 75 mcg Proper intake of levothyroxine is reviewed in details, including on an empty stomach, with water only, at least one hour before taking any medications, vitamins, or supplements and three-four hours before taking iron or calcium.  RTC 3 mos

## 2024-05-06 NOTE — HISTORY OF PRESENT ILLNESS
[FreeTextEntry1] : 54 yo F f/u for multiple issues  *** May 06, 2024 ***  dx'ed with a breast cancer (IDC),ER + , [facility] + , HER2 - s/p lumpectomy and XRT (completed last week). Planned for a hormonal therapy noticed that sugar has been higher on Soliqua 54 to 60 un qd, metformin er 500 mg 4 tabs with dinner, steglatro 15 mg qd, synthroid 75 mcg, Humalog 10 to 12 un ac dinner  (inconsistent with it) labs from 5/2/24 reviewed- a1c- 7.1%, LDL- 75, TSH- 1.12, FT4- 1.2 wearing Dexcom  ----------------------------- Dexcom Clarity ----------------------------- Nicole Edward YOB: 1970 Generated at: Mon, May 6, 2024 4:18 PM EDT Reporting period: Tue Apr 23, 2024 - Mon May 6, 2024 ----------------------------- Glucose Details Average glucose: 168 mg/dL Standard deviation: 48 mg/dL GMI: 7.3% ----------------------------- Time in Range Very High: 6% High: 26% In Range: 67% Low: <1% Very Low: 0%  Target Range  mg/dL  ----------------------------- CGM Details Sensor usage: 93% Days with CGM data: 13/14  *** Oct 02, 2023 ***  feeling well. staying on Noom on Soliqua 52 to 56 un qd, metformin er 500 mg 4 tabs with dinner, steglatro 15 mg qd, synthroid 75 mcg, Humalog 10 to 12 un ac dinner (but not daily) wearing Dexcom G7 Date of download:  10/02/2023  Diabetes Medications and Dosage: as above Indication for CGMS: verify a change in the treatment regimen, identify periods of hypoglycemia/ hyperglycemia.  Modal day report: pattern.  Pt with HYPO  2% of the time ( 5% below 54),  79% in target range Hyperglycemia:  14% elevation  Identified issues: carbohydrate counting. new sensor- false low alarms  dates analyzed:  09/19/2023- 10/02/2023  labs from 9/17/23-  a1c- 6.7%, creat- 0.85,LDL- 84, ca- 10.0, TSH- 0.548, T3- 114 had routine gyn labs checked as well- marginally elevated free and total testo, DHEAS.  denies hirsutism, hair loss, galactorrhea. going through menopause  *** May 08, 2023 ***  feels well, starting Noom diet back on Soliqua 46 to 48 un qd, metformin er 500 mg 4 tabs with dinner, steglatro 15 mg qd, synthroid 88 mcg labs from 4/27/23 reviewed- a1c- 6.8, TG- 217, LDL- 64, TSH-0.257, 25D- 68.9 Date of download:  05/08/2023  Diabetes Medications and Dosage: as above Indication for CGMS: verify a change in the treatment regimen, identify periods of hypoglycemia/ hyperglycemia.  Modal day report: pattern.  Pt with HYPO  0% of the time ( NONE below 54),  84% in target range Hyperglycemia:  16% elevation  Identified issues: carbohydrate counting dates analyzed: 04/24/2023 - 05/08/2023  *** Jan 05, 2023 ***  stopped Mounjaro after 1 month due to nausea/ GI distress had issues with receiving Tresiba, did not resume Soliqua. diet has been worse within the past 2 months was also off Crestor for sometime taking  metformin er 500 mg 4 tabs with dinner, steglatro 15 mg qd only  Date of download:  01/05/2023  Diabetes Medications and Dosage: as above Indication for CGMS: verify a change in the treatment regimen, identify periods of hypoglycemia/ hyperglycemia.  Modal day report: pattern.  Pt with HYPO  0% of the time ( NONE below 54),  25% in target range Hyperglycemia:  75% elevation  Identified issues: carbohydrate counting dates analyzed:  12/23/2022- 01/05/2023  labs from 12/23/22- a1c- 6.3, TG- 363, LDL- 166, TC- 281, TSH- 0.53  *** Aug 31, 2022 ***  feels fine  self decreased Soliqua to 35 un qd, metformin er 500 mg 4 tabs with dinner, steglatro 15 mg qd,  synthroid 88 mcg, benicar 40mg, crestor 10 mg HS wearing gris Date of download:  08/31/2022  Diabetes Medications and Dosage: as above Indication for CGMS: verify a change in the treatment regimen, identify periods of hypoglycemia/ hyperglycemia.  Modal day report: pattern.  Pt with HYPO  0% of the time ( NONE below 54),  78% in target range Hyperglycemia:  22% elevation  Identified issues: carbohydrate counting dates analyzed:  08/18/2022- 08/31/2022  labs from 8/16/22- a1c- 6.8%, TSH- 0.439, LDL- 72  *** Feb 01, 2022 ***  feels well, no new c/o on Soliqua 57 units sq daily, metformin er 500 mg 4 tabs with dinner, steglatro 15 mg qd,  synthroid 88 mcg, benicar 40mg, crestor 10 mg HS wearing dexcom Date of download:  2/1/22 Diabetes Medications and Dosage: as above Indication for CGMS: verify a change in the treatment regimen, identify periods of hypoglycemia/ hyperglycemia.  Modal day report: pattern.  Pt with HYPO  3% of the time (<1% below 54),  91% in target range Hyperglycemia:  6% elevation  Identified issues: carbohydrate counting dates analyzed: 1/19/22-2/1/22  labs from 1/14/22- a1c- 6.1, LDL- 104, creat- 0.74  *** Aug 09, 2021 ***  on Soliqua 60 units sq daily, metformin er 500 mg 4 tabs with dinner, steglatro 15 mg qd,  synthroid 88 mcg, benicar 40mg, crestor 10 mg HS  Date of download:  8/6/21 Diabetes Medications and Dosage: as above Indication for CGMS: verify a change in the treatment regimen, identify periods of hypoglycemia/ hyperglycemia.  Modal day report: pattern.  Pt with HYPO  3% of the time (<1% below 54),  89% in target range Hyperglycemia:  7% elevation  Identified issues: carbohydrate counting dates analyzed: 4/21/21-7/19/21  labs from 7/24/21- a1c- 5.6, LDL-80, TSH- 0.87, 25D- 54  *** Apr 05, 2021 ***  feels well, less active b/o the weather, regained some weight. received covid vaccine on Soliqua 60 units sq daily, metformin er 500 mg 4 tabs with dinner, steglatro 15 mg qd, Humalog 6-8 un ac D, synthroid 100 mcg, benicar 40mg, crestor 10 mg HS  labs done 2 days ago- still pending  Date of download:  4/5/21 Diabetes Medications and Dosage: as above Indication for CGMS: verify a change in the treatment regimen, identify periods of hypoglycemia/ hyperglycemia.  Modal day report: pattern.  Pt with HYPO  1%% of the time (NONE below 54),  94% in target range Hyperglycemia:  5% elevation  Identified issues: carbohydrate counting, occas spikes post dinner and hypos' at night (typically when injects higher amount of humalog) dates analyzed: 1/6/21-4/5/21  *** Dec 31, 2020 ***  feels great. lost more weight. walks more on Soliqua 60 units sq daily, metformin er 500 mg 4 tabs with dinner, steglatro 15 mg qd, Humalog 10 un ac D, synthroid 100 mcg, benicar 40mg, crestor 10 mg HS  a1c- 5.6, LDL- 80, TSH- 0.53, FT4- 1.4  on Dexcom G6  Date of download:  12/31/20 Diabetes Medications and Dosage: as above Indication for CGMS: verify a change in the treatment regimen, identify periods of hypoglycemia/ hyperglycemia.  Modal day report: pattern.  Pt with HYPO  1% of the time (0.1% below 54),  93% in target range Hyperglycemia: 6 % elevation  Identified issues: carbohydrate counting dates analyzed:10/3/20-12/31/20  *** Sep 21, 2020 ***  on Soliqua 60 units sq daily, metformin er 500 mg 4 tabs with dinner, steglatro 15 mg qd, Humalog 10 un ac D, synthroid 100 mcg, benicar 40mg, crestor 10 mg HS off neurontin- "was not working", switched to nabumetone reports fbs-  110's, ppg- not checking feels well overall. lost 14 lbs since 01/20   *** Televisit  Apr 27, 2020 ***  feels well, no new c/o on Soliqua 60 units sq daily, metformin er 500 mg 4 tabs with dinner, steglatro 15 mg qd, Fiasp 10 un ac D, synthroid 100 mcg, benicar 40mg, crestor 10 mg HS  Reports Average AM BS  -126, highest 169, lowest 91 ( midday) Average PM BS levels - 192, highest 322. lowest 116  a1c - 6.3 TG- 224, LDL- 70  *** Jan 23, 2020 ***  on Soliqua 60 units sq daily,  metformin er 500 mg 4 tabs with dinner,  steglatro 5 mg qd ,  Fiasp 6 un ac D,  synthroid 100 mcg, benicar 40mg, crestor 10 mg HS  a1c- 6.8, TSH- 0.29, LDL- 73, TG- 239  Log: fbs-  116- 167, ac L- 123-127, ac D- 172- 248  *** Oct 24, 2019 ***  on soliqua 57 un qd,  metformin er 500 mg 4 tabs with dinner, synthroid 100 mcg, benicar 40mg, crestor 10 mg HS 24h UFC- 15 no repeat labs yet log: fbs- 110-183, with few episodes of mid 200's (when forgets soliqua), p/D- 183-327  HISTORY OF PRESENT ILLNESS.   Ms. EDWARD was diagnosed with Diabetes Mellitus Type 2 more than 15 years ago Reports history HTN, dyslipidemia, obesity, diabetic neuropathy, hypothyroidism.  She denies CAD,  known complications of retinopathy, nephropathy.  Presently on januvia 100 mg, metformin 1000 mg bid, armour 60 mg, benicar 40mg, crestor 10 mg HS She's not checking her blood sugars at home. States that forgets to take morning dose of metformin frequently. She states that her armour is expensive and wants to be switched to L-thyroxine. Fingerstick glucose in the office today is 184  mg/dL  (fasting).  Diet: not following ADA Exercise: none  Lab review: a1c- 9.7, ast/alt- 60/63, LDL- 71, TSH- 2.22, neg celiac screening   ****  Last dilated eye - 8/22 Last podiatry visit  - 2021 Last cardiology evaluation - about 3 years ago Last stress test - about 3 years ago (per patient- normal) Last 2-D Echo - about 3 years ago (per patient- normal) Last nephrology evaluation - none Last neurology evaluation- 2018

## 2024-05-14 ENCOUNTER — NON-APPOINTMENT (OUTPATIENT)
Age: 54
End: 2024-05-14

## 2024-05-20 ENCOUNTER — RX RENEWAL (OUTPATIENT)
Age: 54
End: 2024-05-20

## 2024-05-20 RX ORDER — BLOOD-GLUCOSE SENSOR
EACH MISCELLANEOUS
Qty: 9 | Refills: 3 | Status: ACTIVE | COMMUNITY
Start: 2023-05-08 | End: 1900-01-01

## 2024-05-21 ENCOUNTER — TRANSCRIPTION ENCOUNTER (OUTPATIENT)
Age: 54
End: 2024-05-21

## 2024-05-21 RX ORDER — BLOOD-GLUCOSE SENSOR
EACH MISCELLANEOUS
Qty: 6 | Refills: 1 | Status: ACTIVE | COMMUNITY
Start: 2024-05-21 | End: 1900-01-01

## 2024-05-28 ENCOUNTER — TRANSCRIPTION ENCOUNTER (OUTPATIENT)
Age: 54
End: 2024-05-28

## 2024-06-05 ENCOUNTER — TRANSCRIPTION ENCOUNTER (OUTPATIENT)
Age: 54
End: 2024-06-05

## 2024-06-12 ENCOUNTER — NON-APPOINTMENT (OUTPATIENT)
Age: 54
End: 2024-06-12

## 2024-06-12 RX ORDER — ANASTROZOLE TABLETS 1 MG/1
1 TABLET ORAL
Qty: 90 | Refills: 1 | Status: ACTIVE | COMMUNITY
Start: 2024-06-12 | End: 1900-01-01

## 2024-06-17 ENCOUNTER — APPOINTMENT (OUTPATIENT)
Dept: RADIATION ONCOLOGY | Facility: CLINIC | Age: 54
End: 2024-06-17
Payer: COMMERCIAL

## 2024-06-17 VITALS
RESPIRATION RATE: 18 BRPM | SYSTOLIC BLOOD PRESSURE: 124 MMHG | DIASTOLIC BLOOD PRESSURE: 80 MMHG | OXYGEN SATURATION: 99 % | WEIGHT: 217.92 LBS | HEART RATE: 95 BPM | HEIGHT: 65 IN | BODY MASS INDEX: 36.31 KG/M2

## 2024-06-17 DIAGNOSIS — C50.911 MALIGNANT NEOPLASM OF UNSPECIFIED SITE OF RIGHT FEMALE BREAST: ICD-10-CM

## 2024-06-17 PROCEDURE — 99024 POSTOP FOLLOW-UP VISIT: CPT

## 2024-06-17 NOTE — REVIEW OF SYSTEMS
[Fatigue: Grade 0] : Fatigue: Grade 0 [Breast Pain: Grade 0] : Breast Pain: Grade 0 [Pruritus: Grade 0] : Pruritus: Grade 0 [Skin Hyperpigmentation: Grade 1 - Hyperpigmentation covering <10% BSA; no psychosocial impact] : Skin Hyperpigmentation: Grade 1 - Hyperpigmentation covering <10% BSA; no psychosocial impact [Dermatitis Radiation: Grade 1 - Faint erythema or dry desquamation] : Dermatitis Radiation: Grade 1 - Faint erythema or dry desquamation [FreeTextEntry7] : occasional twinges

## 2024-06-17 NOTE — HISTORY OF PRESENT ILLNESS
[FreeTextEntry1] : Ms. LIZZY PEÑA is a 53-year-old woman, who presents for post treatment evaluation appointment.   She is accompanied by her mother for today's visit.    Diagnosis: pT1c pN0, IDC of the RIGHT breast, stage IA, G2. Two nodes negative (0/2). ER/SC positive, HER2 negative. Oncotype DX Recurrence Score: 20  Oncologic history: Patient went for mammogram in November 2023. She did not have any breast complaints at that time--denies any masses, skin changes, nipple discharge. 11/21/2023 Mammogram showed a 0.7cm irregular nodule in the upper outer portion of the right breast. L neg. BIRADS 4 11/21/2023: Bilateral breast US: suspicious nodule in the area of mammographic concern upper outer right breast. US guided biopsy of the right breast nodule is recommended.  12/6/2023 RIGHT breast US guided biopsy: [facility] Slide Review: Invasive well to moderately differentiated ductal carcinoma, measuring 0.7cm in greatest dimension. No lymphovascular permeation by tumor seen.  ER >95%, SC >95%, HER2 negative.   1/9/2024 MRI: Susceptibility artifact from a bx clip in seen in the R mid/posterior UOQ associated w/ a 2.5 x 1.0 cm spiculated enhancing mass corresponding to known malignancy; mass is 1.7 cm away from the dermis (where there is no overlying suspicious enhancement) L neg 2-3 slightly borderline prominent B/L axillary LN w/ minimal cortical thickening w/ a representative LN in the R axilla measuring up to 2.0 x 1.3 cm and a representative LN in the L axilla measuring 1.9 x 1.2 cm, no significant internal mammary LAD BR6  2/24/2023 S/p RIGHT lumpectomy with SLNB with Dr. Vasquez (surgeon) Invasive moderately differentiated ductal carcinoma measuring 11mm, G2, Negative margins, no LVI, no DCIS identified, 0/2 nodes positive. ER 95%, SC 95%, HER 2 0%. pT1cN0, [id number] Oncotype DX Recurrence Score: 20  3/18/24 - presented in consultation for discussion of radiation therapy options. Ms. Peña is feeling well today and has healed well from her surgery.  She looks forward to next steps in her treatment. Oncotype Dx score is still pending, ETA 3/27/24 and she is to see Dr. Hernandez ([facility]) on 3/29/24. Discussed the role of radiation in early breast cancer. We have discussed options for management, including participation in  trial, partial breast and whole breast irradiation and fractionation regimens. We have discussed the common and rarer acute and late side effects of adjuvant breast radiation as well of logistics of treatment, skin care and wellness during treatment. She has had written information to take away regarding radiation, and the trial. Once she has seen Dr Hernandez, we will finalize the treatment plan. If she were to have radiation, partial breast over 1-2 weeks would be planned.  3/25/24 - saw Dr. Hernandez ([facility]) in consultation ...... no chemotherapy indicated.  Recommend endocrine therapy, Anastrozole, to start after completion of radiation therapy.    4/23/24 - 4/29/24: Received RADIATION Therapy to RIGHT Breast, 2600 cGy in 5 fx   6/17/24 - presents for post treatment evaluation appointment.  Ms. Peña is feeling well today.  She states she started the Anastrozole tablets today.  Denies any pain. Skin with some hyperpigmentation and mild pinkness.  Continues to moisturize prn.  She is to follow up with Dr. Hernandez ([facility]) and Dr. Vasquez (surgeon) as directed.

## 2024-06-17 NOTE — PHYSICAL EXAM
[Normal] : well developed, well nourished, in no acute distress [Sclera] : the sclera and conjunctiva were normal [Outer Ear] : the ears and nose were normal in appearance [] : no respiratory distress [Breast Palpation Mass] : no palpable masses [No UE Edema] : there is no upper extremity edema [de-identified] : radiation changes to right breast

## 2024-06-17 NOTE — REASON FOR VISIT
[Post-Treatment Evaluation] : post-treatment evaluation for [Breast Cancer] : breast cancer [Parent] : parent

## 2024-06-28 ENCOUNTER — TRANSCRIPTION ENCOUNTER (OUTPATIENT)
Age: 54
End: 2024-06-28

## 2024-07-01 ENCOUNTER — RX RENEWAL (OUTPATIENT)
Age: 54
End: 2024-07-01

## 2024-07-12 ENCOUNTER — APPOINTMENT (OUTPATIENT)
Dept: ENDOCRINOLOGY | Facility: CLINIC | Age: 54
End: 2024-07-12
Payer: COMMERCIAL

## 2024-07-12 PROCEDURE — G0108 DIAB MANAGE TRN  PER INDIV: CPT

## 2024-07-12 RX ORDER — INSULIN LISPRO 100 [IU]/ML
100 INJECTION, SOLUTION INTRAVENOUS; SUBCUTANEOUS
Qty: 3 | Refills: 3 | Status: ACTIVE | COMMUNITY
Start: 2024-07-12 | End: 1900-01-01

## 2024-08-12 ENCOUNTER — APPOINTMENT (OUTPATIENT)
Dept: ENDOCRINOLOGY | Facility: CLINIC | Age: 54
End: 2024-08-12
Payer: COMMERCIAL

## 2024-08-12 VITALS
OXYGEN SATURATION: 95 % | RESPIRATION RATE: 16 BRPM | HEART RATE: 87 BPM | TEMPERATURE: 209.48 F | DIASTOLIC BLOOD PRESSURE: 76 MMHG | BODY MASS INDEX: 36.15 KG/M2 | HEIGHT: 65 IN | SYSTOLIC BLOOD PRESSURE: 117 MMHG | WEIGHT: 217 LBS

## 2024-08-12 DIAGNOSIS — E11.65 TYPE 2 DIABETES MELLITUS WITH HYPERGLYCEMIA: ICD-10-CM

## 2024-08-12 DIAGNOSIS — E66.9 OBESITY, UNSPECIFIED: ICD-10-CM

## 2024-08-12 DIAGNOSIS — E03.9 HYPOTHYROIDISM, UNSPECIFIED: ICD-10-CM

## 2024-08-12 DIAGNOSIS — E78.5 HYPERLIPIDEMIA, UNSPECIFIED: ICD-10-CM

## 2024-08-12 DIAGNOSIS — I10 ESSENTIAL (PRIMARY) HYPERTENSION: ICD-10-CM

## 2024-08-12 LAB — GLUCOSE BLDC GLUCOMTR-MCNC: 140

## 2024-08-12 PROCEDURE — 99214 OFFICE O/P EST MOD 30 MIN: CPT | Mod: 25

## 2024-08-12 PROCEDURE — 36415 COLL VENOUS BLD VENIPUNCTURE: CPT

## 2024-08-12 PROCEDURE — 95251 CONT GLUC MNTR ANALYSIS I&R: CPT

## 2024-08-12 NOTE — HISTORY OF PRESENT ILLNESS
[FreeTextEntry1] : 52 yo F f/u for multiple issues  *** Aug 12, 2024 ***  feels well, adjusted to CeQur well  taking Tresiba 56 un qd , Mounjaro 2.5 mg qw,  metformin er 500 mg 4 tabs with dinner, steglatro 15 mg qd,  CeQur patch (clicks) 3-3-5, synthroid 75 mcg wearing Dexcom Date of download:  08/12/2024  Diabetes Medications and Dosage: as above Indication for CGMS: verify a change in the treatment regimen, identify periods of hypoglycemia/ hyperglycemia.  Modal day report: pattern.  Pt with HYPO  0% of the time ( <1% below 54),  92% in target range Hyperglycemia:  8% elevation  Identified issues: carbohydrate counting dates analyzed: 07/30/2024 - 08/12/2024  *** May 06, 2024 ***  dx'ed with a breast cancer (IDC),ER + , HI + , HER2 - s/p lumpectomy and XRT (completed last week). Planned for a hormonal therapy noticed that sugar has been higher on Soliqua 54 to 60 un qd, metformin er 500 mg 4 tabs with dinner, steglatro 15 mg qd, synthroid 75 mcg, Humalog 10 to 12 un ac dinner  (inconsistent with it) labs from 5/2/24 reviewed- a1c- 7.1%, LDL- 75, TSH- 1.12, FT4- 1.2 wearing Dexcom  ----------------------------- Dexcom Clarity ----------------------------- Nicole Edward YOB: 1970 Generated at: Mon, May 6, 2024 4:18 PM EDT Reporting period: Tue Apr 23, 2024 - Mon May 6, 2024 ----------------------------- Glucose Details Average glucose: 168 mg/dL Standard deviation: 48 mg/dL GMI: 7.3% ----------------------------- Time in Range Very High: 6% High: 26% In Range: 67% Low: <1% Very Low: 0%  Target Range  mg/dL  ----------------------------- CGM Details Sensor usage: 93% Days with CGM data: 13/14  *** Oct 02, 2023 ***  feeling well. staying on Noom on Soliqua 52 to 56 un qd, metformin er 500 mg 4 tabs with dinner, steglatro 15 mg qd, synthroid 75 mcg, Humalog 10 to 12 un ac dinner (but not daily) wearing Dexcom G7 Date of download:  10/02/2023  Diabetes Medications and Dosage: as above Indication for CGMS: verify a change in the treatment regimen, identify periods of hypoglycemia/ hyperglycemia.  Modal day report: pattern.  Pt with HYPO  2% of the time ( 5% below 54),  79% in target range Hyperglycemia:  14% elevation  Identified issues: carbohydrate counting. new sensor- false low alarms  dates analyzed:  09/19/2023- 10/02/2023  labs from 9/17/23-  a1c- 6.7%, creat- 0.85,LDL- 84, ca- 10.0, TSH- 0.548, T3- 114 had routine gyn labs checked as well- marginally elevated free and total testo, DHEAS.  denies hirsutism, hair loss, galactorrhea. going through menopause  *** May 08, 2023 ***  feels well, starting Noom diet back on Soliqua 46 to 48 un qd, metformin er 500 mg 4 tabs with dinner, steglatro 15 mg qd, synthroid 88 mcg labs from 4/27/23 reviewed- a1c- 6.8, TG- 217, LDL- 64, TSH-0.257, 25D- 68.9 Date of download:  05/08/2023  Diabetes Medications and Dosage: as above Indication for CGMS: verify a change in the treatment regimen, identify periods of hypoglycemia/ hyperglycemia.  Modal day report: pattern.  Pt with HYPO  0% of the time ( NONE below 54),  84% in target range Hyperglycemia:  16% elevation  Identified issues: carbohydrate counting dates analyzed: 04/24/2023 - 05/08/2023  *** Jan 05, 2023 ***  stopped Mounjaro after 1 month due to nausea/ GI distress had issues with receiving Tresiba, did not resume Soliqua. diet has been worse within the past 2 months was also off Crestor for sometime taking  metformin er 500 mg 4 tabs with dinner, steglatro 15 mg qd only  Date of download:  01/05/2023  Diabetes Medications and Dosage: as above Indication for CGMS: verify a change in the treatment regimen, identify periods of hypoglycemia/ hyperglycemia.  Modal day report: pattern.  Pt with HYPO  0% of the time ( NONE below 54),  25% in target range Hyperglycemia:  75% elevation  Identified issues: carbohydrate counting dates analyzed:  12/23/2022- 01/05/2023  labs from 12/23/22- a1c- 6.3, TG- 363, LDL- 166, TC- 281, TSH- 0.53  *** Aug 31, 2022 ***  feels fine  self decreased Soliqua to 35 un qd, metformin er 500 mg 4 tabs with dinner, steglatro 15 mg qd,  synthroid 88 mcg, benicar 40mg, crestor 10 mg HS wearing gris Date of download:  08/31/2022  Diabetes Medications and Dosage: as above Indication for CGMS: verify a change in the treatment regimen, identify periods of hypoglycemia/ hyperglycemia.  Modal day report: pattern.  Pt with HYPO  0% of the time ( NONE below 54),  78% in target range Hyperglycemia:  22% elevation  Identified issues: carbohydrate counting dates analyzed:  08/18/2022- 08/31/2022  labs from 8/16/22- a1c- 6.8%, TSH- 0.439, LDL- 72  *** Feb 01, 2022 ***  feels well, no new c/o on Soliqua 57 units sq daily, metformin er 500 mg 4 tabs with dinner, steglatro 15 mg qd,  synthroid 88 mcg, benicar 40mg, crestor 10 mg HS wearing dexcom Date of download:  2/1/22 Diabetes Medications and Dosage: as above Indication for CGMS: verify a change in the treatment regimen, identify periods of hypoglycemia/ hyperglycemia.  Modal day report: pattern.  Pt with HYPO  3% of the time (<1% below 54),  91% in target range Hyperglycemia:  6% elevation  Identified issues: carbohydrate counting dates analyzed: 1/19/22-2/1/22  labs from 1/14/22- a1c- 6.1, LDL- 104, creat- 0.74  *** Aug 09, 2021 ***  on Soliqua 60 units sq daily, metformin er 500 mg 4 tabs with dinner, steglatro 15 mg qd,  synthroid 88 mcg, benicar 40mg, crestor 10 mg HS  Date of download:  8/6/21 Diabetes Medications and Dosage: as above Indication for CGMS: verify a change in the treatment regimen, identify periods of hypoglycemia/ hyperglycemia.  Modal day report: pattern.  Pt with HYPO  3% of the time (<1% below 54),  89% in target range Hyperglycemia:  7% elevation  Identified issues: carbohydrate counting dates analyzed: 4/21/21-7/19/21  labs from 7/24/21- a1c- 5.6, LDL-80, TSH- 0.87, 25D- 54  *** Apr 05, 2021 ***  feels well, less active b/o the weather, regained some weight. received covid vaccine on Soliqua 60 units sq daily, metformin er 500 mg 4 tabs with dinner, steglatro 15 mg qd, Humalog 6-8 un ac D, synthroid 100 mcg, benicar 40mg, crestor 10 mg HS  labs done 2 days ago- still pending  Date of download:  4/5/21 Diabetes Medications and Dosage: as above Indication for CGMS: verify a change in the treatment regimen, identify periods of hypoglycemia/ hyperglycemia.  Modal day report: pattern.  Pt with HYPO  1%% of the time (NONE below 54),  94% in target range Hyperglycemia:  5% elevation  Identified issues: carbohydrate counting, occas spikes post dinner and hypos' at night (typically when injects higher amount of humalog) dates analyzed: 1/6/21-4/5/21  *** Dec 31, 2020 ***  feels great. lost more weight. walks more on Soliqua 60 units sq daily, metformin er 500 mg 4 tabs with dinner, steglatro 15 mg qd, Humalog 10 un ac D, synthroid 100 mcg, benicar 40mg, crestor 10 mg HS  a1c- 5.6, LDL- 80, TSH- 0.53, FT4- 1.4  on Dexcom G6  Date of download:  12/31/20 Diabetes Medications and Dosage: as above Indication for CGMS: verify a change in the treatment regimen, identify periods of hypoglycemia/ hyperglycemia.  Modal day report: pattern.  Pt with HYPO  1% of the time (0.1% below 54),  93% in target range Hyperglycemia: 6 % elevation  Identified issues: carbohydrate counting dates analyzed:10/3/20-12/31/20  *** Sep 21, 2020 ***  on Soliqua 60 units sq daily, metformin er 500 mg 4 tabs with dinner, steglatro 15 mg qd, Humalog 10 un ac D, synthroid 100 mcg, benicar 40mg, crestor 10 mg HS off neurontin- "was not working", switched to nabumetone reports fbs-  110's, ppg- not checking feels well overall. lost 14 lbs since 01/20   *** Televisit  Apr 27, 2020 ***  feels well, no new c/o on Soliqua 60 units sq daily, metformin er 500 mg 4 tabs with dinner, steglatro 15 mg qd, Fiasp 10 un ac D, synthroid 100 mcg, benicar 40mg, crestor 10 mg HS  Reports Average AM BS  -126, highest 169, lowest 91 ( midday) Average PM BS levels - 192, highest 322. lowest 116  a1c - 6.3 TG- 224, LDL- 70  *** Jan 23, 2020 ***  on Soliqua 60 units sq daily,  metformin er 500 mg 4 tabs with dinner,  steglatro 5 mg qd ,  Fiasp 6 un ac D,  synthroid 100 mcg, benicar 40mg, crestor 10 mg HS  a1c- 6.8, TSH- 0.29, LDL- 73, TG- 239  Log: fbs-  116- 167, ac L- 123-127, ac D- 172- 248  *** Oct 24, 2019 ***  on soliqua 57 un qd,  metformin er 500 mg 4 tabs with dinner, synthroid 100 mcg, benicar 40mg, crestor 10 mg HS 24h UFC- 15 no repeat labs yet log: fbs- 110-183, with few episodes of mid 200's (when forgets soliqua), p/D- 183-327  HISTORY OF PRESENT ILLNESS.   Ms. EDWARD was diagnosed with Diabetes Mellitus Type 2 more than 15 years ago Reports history HTN, dyslipidemia, obesity, diabetic neuropathy, hypothyroidism.  She denies CAD,  known complications of retinopathy, nephropathy.  Presently on januvia 100 mg, metformin 1000 mg bid, armour 60 mg, benicar 40mg, crestor 10 mg HS She's not checking her blood sugars at home. States that forgets to take morning dose of metformin frequently. She states that her armour is expensive and wants to be switched to L-thyroxine. Fingerstick glucose in the office today is 184  mg/dL  (fasting).  Diet: not following ADA Exercise: none  Lab review: a1c- 9.7, ast/alt- 60/63, LDL- 71, TSH- 2.22, neg celiac screening   ****  Last dilated eye - 8/22 Last podiatry visit  - 2021 Last cardiology evaluation - about 3 years ago Last stress test - about 3 years ago (per patient- normal) Last 2-D Echo - about 3 years ago (per patient- normal) Last nephrology evaluation - none Last neurology evaluation- 2018

## 2024-08-12 NOTE — ASSESSMENT
[Diabetes Foot Care] : diabetes foot care [Long Term Vascular Complications] : long term vascular complications of diabetes [Carbohydrate Consistent Diet] : carbohydrate consistent diet [Importance of Diet and Exercise] : importance of diet and exercise to improve glycemic control, achieve weight loss and improve cardiovascular health [Exercise/Effect on Glucose] : exercise/effect on glucose [Hypoglycemia Management] : hypoglycemia management [Glucagon Use] : glucagon use [Ketone Testing] : ketone testing [Action and use of Insulin] : action and use of short and long-acting insulin [Self Monitoring of Blood Glucose] : self monitoring of blood glucose [Insulin Self-Administration] : insulin self-administration [Injection Technique, Storage, Sharps Disposal] : injection technique, storage, and sharps disposal [Sick-Day Management] : sick-day management [Retinopathy Screening] : Patient was referred to ophthalmology for retinopathy screening [Diabetic Medications] : Risks and benefits of diabetic medications were discussed [FreeTextEntry1] : 1. DM2, uncontrolled - intolerant of high dose of Mounjaro, but is ok on a 2.5 mg dose - Tresiba 56 un qd and uptitrate - metformin er 500 mg 4 tabs with dinner, steglatro 15 mg qd - CeQur patch (clicks) 3-3-5 - continue Benicar, Crestor - we discussed Plenity to help with weight loss, but she wants to focus on the diet first  2. Hypothyroidism -  Synthroid 75 mcg Proper intake of levothyroxine is reviewed in details, including on an empty stomach, with water only, at least one hour before taking any medications, vitamins, or supplements and three-four hours before taking iron or calcium.  RTC 3 mos

## 2024-08-13 ENCOUNTER — TRANSCRIPTION ENCOUNTER (OUTPATIENT)
Age: 54
End: 2024-08-13

## 2024-08-13 LAB
ALBUMIN SERPL ELPH-MCNC: 4.4 G/DL
ALP BLD-CCNC: 84 U/L
ALT SERPL-CCNC: 28 U/L
ANION GAP SERPL CALC-SCNC: 19 MMOL/L
AST SERPL-CCNC: 30 U/L
BILIRUB SERPL-MCNC: 0.2 MG/DL
BUN SERPL-MCNC: 19 MG/DL
CALCIUM SERPL-MCNC: 10.4 MG/DL
CHLORIDE SERPL-SCNC: 102 MMOL/L
CO2 SERPL-SCNC: 20 MMOL/L
CREAT SERPL-MCNC: 0.74 MG/DL
EGFR: 97 ML/MIN/1.73M2
ESTIMATED AVERAGE GLUCOSE: 154 MG/DL
GLUCOSE SERPL-MCNC: 126 MG/DL
HBA1C MFR BLD HPLC: 7 %
POTASSIUM SERPL-SCNC: 4.6 MMOL/L
PROT SERPL-MCNC: 7.1 G/DL
SODIUM SERPL-SCNC: 141 MMOL/L
T4 FREE SERPL-MCNC: 1.3 NG/DL
TSH SERPL-ACNC: 0.96 UIU/ML

## 2024-09-20 ENCOUNTER — RX RENEWAL (OUTPATIENT)
Age: 54
End: 2024-09-20

## 2024-10-14 ENCOUNTER — APPOINTMENT (OUTPATIENT)
Dept: PHYSICAL MEDICINE AND REHAB | Facility: CLINIC | Age: 54
End: 2024-10-14
Payer: COMMERCIAL

## 2024-10-14 VITALS
BODY MASS INDEX: 36.15 KG/M2 | DIASTOLIC BLOOD PRESSURE: 80 MMHG | TEMPERATURE: 208.76 F | SYSTOLIC BLOOD PRESSURE: 140 MMHG | OXYGEN SATURATION: 98 % | HEIGHT: 65 IN | HEART RATE: 78 BPM | RESPIRATION RATE: 17 BRPM | WEIGHT: 217 LBS

## 2024-10-14 DIAGNOSIS — M25.532 PAIN IN RIGHT WRIST: ICD-10-CM

## 2024-10-14 DIAGNOSIS — M25.531 PAIN IN RIGHT WRIST: ICD-10-CM

## 2024-10-14 PROCEDURE — 36415 COLL VENOUS BLD VENIPUNCTURE: CPT

## 2024-10-14 PROCEDURE — 99214 OFFICE O/P EST MOD 30 MIN: CPT

## 2024-10-14 RX ORDER — OMEPRAZOLE 40 MG/1
40 CAPSULE, DELAYED RELEASE ORAL TWICE DAILY
Qty: 180 | Refills: 1 | Status: ACTIVE | COMMUNITY
Start: 2024-10-14 | End: 1900-01-01

## 2024-10-17 LAB
ANA SER IF-ACNC: NEGATIVE
CCP AB SER IA-ACNC: <8 U/ML
CRP SERPL-MCNC: 4 MG/L
DSDNA AB SER-ACNC: <1 IU/ML
ERYTHROCYTE [SEDIMENTATION RATE] IN BLOOD BY WESTERGREN METHOD: 19 MM/HR
RF+CCP IGG SER-IMP: NEGATIVE
RHEUMATOID FACT SER QL: <10 IU/ML

## 2024-11-04 ENCOUNTER — APPOINTMENT (OUTPATIENT)
Dept: PHYSICAL MEDICINE AND REHAB | Facility: CLINIC | Age: 54
End: 2024-11-04

## 2024-11-04 VITALS
WEIGHT: 217 LBS | TEMPERATURE: 207.14 F | BODY MASS INDEX: 36.15 KG/M2 | SYSTOLIC BLOOD PRESSURE: 124 MMHG | RESPIRATION RATE: 18 BRPM | HEIGHT: 65 IN | DIASTOLIC BLOOD PRESSURE: 72 MMHG | HEART RATE: 83 BPM | OXYGEN SATURATION: 97 %

## 2024-11-04 DIAGNOSIS — M25.531 PAIN IN RIGHT WRIST: ICD-10-CM

## 2024-11-04 DIAGNOSIS — M25.532 PAIN IN RIGHT WRIST: ICD-10-CM

## 2024-11-04 PROCEDURE — 97810 ACUP 1/> WO ESTIM 1ST 15 MIN: CPT

## 2024-11-04 PROCEDURE — 97811 ACUP 1/> W/O ESTIM EA ADD 15: CPT

## 2024-11-13 ENCOUNTER — APPOINTMENT (OUTPATIENT)
Dept: PHYSICAL MEDICINE AND REHAB | Facility: CLINIC | Age: 54
End: 2024-11-13

## 2024-11-22 ENCOUNTER — OUTPATIENT (OUTPATIENT)
Dept: OUTPATIENT SERVICES | Facility: HOSPITAL | Age: 54
LOS: 1 days | Discharge: HOME | End: 2024-11-22

## 2024-11-22 DIAGNOSIS — Z98.890 OTHER SPECIFIED POSTPROCEDURAL STATES: Chronic | ICD-10-CM

## 2024-11-22 DIAGNOSIS — D05.11 INTRADUCTAL CARCINOMA IN SITU OF RIGHT BREAST: ICD-10-CM

## 2024-11-25 ENCOUNTER — APPOINTMENT (OUTPATIENT)
Dept: ENDOCRINOLOGY | Facility: CLINIC | Age: 54
End: 2024-11-25
Payer: COMMERCIAL

## 2024-11-25 VITALS
SYSTOLIC BLOOD PRESSURE: 117 MMHG | DIASTOLIC BLOOD PRESSURE: 72 MMHG | TEMPERATURE: 208.22 F | RESPIRATION RATE: 18 BRPM | WEIGHT: 208 LBS | BODY MASS INDEX: 34.66 KG/M2 | HEART RATE: 92 BPM | OXYGEN SATURATION: 97 % | HEIGHT: 65 IN

## 2024-11-25 DIAGNOSIS — E11.65 TYPE 2 DIABETES MELLITUS WITH HYPERGLYCEMIA: ICD-10-CM

## 2024-11-25 LAB — GLUCOSE BLDC GLUCOMTR-MCNC: 147

## 2024-11-25 PROCEDURE — 82962 GLUCOSE BLOOD TEST: CPT

## 2024-11-25 PROCEDURE — 99214 OFFICE O/P EST MOD 30 MIN: CPT

## 2024-11-25 PROCEDURE — 95251 CONT GLUC MNTR ANALYSIS I&R: CPT

## 2024-11-26 ENCOUNTER — NON-APPOINTMENT (OUTPATIENT)
Age: 54
End: 2024-11-26

## 2024-11-26 ENCOUNTER — APPOINTMENT (OUTPATIENT)
Dept: INTERNAL MEDICINE | Facility: CLINIC | Age: 54
End: 2024-11-26
Payer: COMMERCIAL

## 2024-11-26 VITALS
WEIGHT: 207 LBS | SYSTOLIC BLOOD PRESSURE: 118 MMHG | HEIGHT: 65 IN | HEART RATE: 71 BPM | RESPIRATION RATE: 18 BRPM | BODY MASS INDEX: 34.49 KG/M2 | TEMPERATURE: 206.96 F | OXYGEN SATURATION: 97 % | DIASTOLIC BLOOD PRESSURE: 70 MMHG

## 2024-11-26 DIAGNOSIS — Z00.00 ENCOUNTER FOR GENERAL ADULT MEDICAL EXAMINATION W/OUT ABNORMAL FINDINGS: ICD-10-CM

## 2024-11-26 DIAGNOSIS — I10 ESSENTIAL (PRIMARY) HYPERTENSION: ICD-10-CM

## 2024-11-26 DIAGNOSIS — E03.9 HYPOTHYROIDISM, UNSPECIFIED: ICD-10-CM

## 2024-11-26 DIAGNOSIS — E11.9 TYPE 2 DIABETES MELLITUS W/OUT COMPLICATIONS: ICD-10-CM

## 2024-11-26 DIAGNOSIS — E66.9 OBESITY, UNSPECIFIED: ICD-10-CM

## 2024-11-26 DIAGNOSIS — E78.5 HYPERLIPIDEMIA, UNSPECIFIED: ICD-10-CM

## 2024-11-26 PROCEDURE — 99396 PREV VISIT EST AGE 40-64: CPT

## 2024-11-26 PROCEDURE — 99214 OFFICE O/P EST MOD 30 MIN: CPT | Mod: 25

## 2024-11-27 ENCOUNTER — RESULT REVIEW (OUTPATIENT)
Age: 54
End: 2024-11-27

## 2024-11-27 ENCOUNTER — APPOINTMENT (OUTPATIENT)
Dept: HEMATOLOGY ONCOLOGY | Facility: CLINIC | Age: 54
End: 2024-11-27
Payer: COMMERCIAL

## 2024-11-27 ENCOUNTER — APPOINTMENT (OUTPATIENT)
Dept: ULTRASOUND IMAGING | Facility: IMAGING CENTER | Age: 54
End: 2024-11-27
Payer: COMMERCIAL

## 2024-11-27 ENCOUNTER — OUTPATIENT (OUTPATIENT)
Dept: OUTPATIENT SERVICES | Facility: HOSPITAL | Age: 54
LOS: 1 days | End: 2024-11-27
Payer: COMMERCIAL

## 2024-11-27 ENCOUNTER — APPOINTMENT (OUTPATIENT)
Dept: MAMMOGRAPHY | Facility: IMAGING CENTER | Age: 54
End: 2024-11-27
Payer: COMMERCIAL

## 2024-11-27 VITALS
WEIGHT: 209.88 LBS | HEART RATE: 77 BPM | TEMPERATURE: 97.1 F | RESPIRATION RATE: 16 BRPM | SYSTOLIC BLOOD PRESSURE: 130 MMHG | OXYGEN SATURATION: 96 % | BODY MASS INDEX: 34.93 KG/M2 | DIASTOLIC BLOOD PRESSURE: 80 MMHG

## 2024-11-27 DIAGNOSIS — Z00.8 ENCOUNTER FOR OTHER GENERAL EXAMINATION: ICD-10-CM

## 2024-11-27 DIAGNOSIS — Z98.890 OTHER SPECIFIED POSTPROCEDURAL STATES: Chronic | ICD-10-CM

## 2024-11-27 PROCEDURE — 76641 ULTRASOUND BREAST COMPLETE: CPT | Mod: 26,50

## 2024-11-27 PROCEDURE — G0279: CPT | Mod: 26

## 2024-11-27 PROCEDURE — 77066 DX MAMMO INCL CAD BI: CPT

## 2024-11-27 PROCEDURE — 99214 OFFICE O/P EST MOD 30 MIN: CPT

## 2024-11-27 PROCEDURE — 77066 DX MAMMO INCL CAD BI: CPT | Mod: 26

## 2024-11-27 PROCEDURE — G0279: CPT

## 2024-11-27 PROCEDURE — G2211 COMPLEX E/M VISIT ADD ON: CPT | Mod: NC

## 2024-11-27 PROCEDURE — 76641 ULTRASOUND BREAST COMPLETE: CPT

## 2024-12-01 LAB
APPEARANCE: CLEAR
BILIRUBIN URINE: NEGATIVE
BLOOD URINE: NEGATIVE
COLOR: YELLOW
CREAT SPEC-SCNC: 132 MG/DL
GLUCOSE QUALITATIVE U: >=1000 MG/DL
KETONES URINE: NEGATIVE MG/DL
LEUKOCYTE ESTERASE URINE: NEGATIVE
MICROALBUMIN 24H UR DL<=1MG/L-MCNC: <1.2 MG/DL
MICROALBUMIN/CREAT 24H UR-RTO: NORMAL MG/G
NITRITE URINE: NEGATIVE
PH URINE: 5.5
PROTEIN URINE: NEGATIVE MG/DL
SPECIFIC GRAVITY URINE: >1.03
UROBILINOGEN URINE: 0.2 MG/DL

## 2024-12-09 ENCOUNTER — RX RENEWAL (OUTPATIENT)
Age: 54
End: 2024-12-09

## 2024-12-09 ENCOUNTER — APPOINTMENT (OUTPATIENT)
Dept: SURGICAL ONCOLOGY | Facility: CLINIC | Age: 54
End: 2024-12-09
Payer: COMMERCIAL

## 2024-12-09 VITALS
DIASTOLIC BLOOD PRESSURE: 78 MMHG | OXYGEN SATURATION: 97 % | SYSTOLIC BLOOD PRESSURE: 120 MMHG | HEART RATE: 85 BPM | BODY MASS INDEX: 34.82 KG/M2 | HEIGHT: 65 IN | WEIGHT: 209 LBS

## 2024-12-09 DIAGNOSIS — C50.911 MALIGNANT NEOPLASM OF UNSPECIFIED SITE OF RIGHT FEMALE BREAST: ICD-10-CM

## 2024-12-09 PROCEDURE — 99214 OFFICE O/P EST MOD 30 MIN: CPT

## 2024-12-09 RX ORDER — TIRZEPATIDE 2.5 MG/.5ML
2.5 INJECTION, SOLUTION SUBCUTANEOUS
Qty: 6 | Refills: 3 | Status: ACTIVE | COMMUNITY
Start: 2024-12-09 | End: 1900-01-01

## 2024-12-09 RX ORDER — PEN NEEDLE, DIABETIC 32GX 5/32"
32G X 4 MM NEEDLE, DISPOSABLE MISCELLANEOUS
Qty: 6 | Refills: 3 | Status: ACTIVE | COMMUNITY
Start: 2024-12-09 | End: 1900-01-01

## 2025-02-12 ENCOUNTER — APPOINTMENT (OUTPATIENT)
Dept: INTERNAL MEDICINE | Facility: CLINIC | Age: 55
End: 2025-02-12

## 2025-03-03 ENCOUNTER — RX RENEWAL (OUTPATIENT)
Age: 55
End: 2025-03-03

## 2025-03-03 RX ORDER — DAPAGLIFLOZIN 5 MG/1
5 TABLET, FILM COATED ORAL DAILY
Qty: 90 | Refills: 0 | Status: ACTIVE | COMMUNITY
Start: 2025-03-03 | End: 1900-01-01

## 2025-04-15 NOTE — H&P PST ADULT - ATTENDING PHYSICIAN: I HAVE REVIEWED THE CLINICAL DOCUMENTATION AND AGREE WITH THE ABOVE NOTE
Ronan Westbrook was seen and treated in our emergency department on 4/15/2025.                Diagnosis:     Ronan  may return to work on return date.    She may return on this date: 04/17/2025         If you have any questions or concerns, please don't hesitate to call.      Donna Morel MD    ______________________________           _______________          _______________  Hospital Representative                              Date                                Time
Statement Selected

## 2025-06-07 ENCOUNTER — OUTPATIENT (OUTPATIENT)
Dept: OUTPATIENT SERVICES | Facility: HOSPITAL | Age: 55
LOS: 1 days | Discharge: HOME | End: 2025-06-07

## 2025-06-07 DIAGNOSIS — Z98.890 OTHER SPECIFIED POSTPROCEDURAL STATES: Chronic | ICD-10-CM

## 2025-06-07 DIAGNOSIS — D05.11 INTRADUCTAL CARCINOMA IN SITU OF RIGHT BREAST: ICD-10-CM

## 2025-06-09 ENCOUNTER — APPOINTMENT (OUTPATIENT)
Dept: HEMATOLOGY ONCOLOGY | Facility: CLINIC | Age: 55
End: 2025-06-09
Payer: COMMERCIAL

## 2025-06-09 VITALS
HEART RATE: 85 BPM | RESPIRATION RATE: 16 BRPM | TEMPERATURE: 97.3 F | HEIGHT: 64.96 IN | SYSTOLIC BLOOD PRESSURE: 129 MMHG | DIASTOLIC BLOOD PRESSURE: 77 MMHG | OXYGEN SATURATION: 98 % | WEIGHT: 215.15 LBS | BODY MASS INDEX: 35.85 KG/M2

## 2025-06-09 PROCEDURE — G2211 COMPLEX E/M VISIT ADD ON: CPT | Mod: NC

## 2025-06-09 PROCEDURE — 99214 OFFICE O/P EST MOD 30 MIN: CPT

## 2025-06-19 ENCOUNTER — APPOINTMENT (OUTPATIENT)
Dept: ENDOCRINOLOGY | Facility: CLINIC | Age: 55
End: 2025-06-19
Payer: COMMERCIAL

## 2025-06-19 VITALS
OXYGEN SATURATION: 96 % | SYSTOLIC BLOOD PRESSURE: 116 MMHG | WEIGHT: 213 LBS | HEART RATE: 95 BPM | HEIGHT: 64.96 IN | BODY MASS INDEX: 35.49 KG/M2 | RESPIRATION RATE: 16 BRPM | DIASTOLIC BLOOD PRESSURE: 78 MMHG

## 2025-06-19 LAB — GLUCOSE BLDC GLUCOMTR-MCNC: 99

## 2025-06-19 PROCEDURE — 95251 CONT GLUC MNTR ANALYSIS I&R: CPT

## 2025-06-19 PROCEDURE — 99214 OFFICE O/P EST MOD 30 MIN: CPT | Mod: 25

## 2025-06-20 LAB
CREAT SPEC-SCNC: 116 MG/DL
MICROALBUMIN 24H UR DL<=1MG/L-MCNC: <1.2 MG/DL
MICROALBUMIN/CREAT 24H UR-RTO: NORMAL MG/G

## 2025-06-30 ENCOUNTER — RX RENEWAL (OUTPATIENT)
Age: 55
End: 2025-06-30

## 2025-08-01 ENCOUNTER — RX RENEWAL (OUTPATIENT)
Age: 55
End: 2025-08-01

## 2025-08-04 ENCOUNTER — APPOINTMENT (OUTPATIENT)
Dept: SURGICAL ONCOLOGY | Facility: CLINIC | Age: 55
End: 2025-08-04
Payer: COMMERCIAL

## 2025-08-04 VITALS
OXYGEN SATURATION: 96 % | SYSTOLIC BLOOD PRESSURE: 130 MMHG | HEART RATE: 90 BPM | BODY MASS INDEX: 35.49 KG/M2 | DIASTOLIC BLOOD PRESSURE: 80 MMHG | WEIGHT: 213 LBS | HEIGHT: 64.96 IN

## 2025-08-04 DIAGNOSIS — C50.911 MALIGNANT NEOPLASM OF UNSPECIFIED SITE OF RIGHT FEMALE BREAST: ICD-10-CM

## 2025-08-04 PROCEDURE — 99214 OFFICE O/P EST MOD 30 MIN: CPT

## 2025-08-04 PROCEDURE — G2211 COMPLEX E/M VISIT ADD ON: CPT | Mod: NC

## 2025-08-05 ENCOUNTER — TRANSCRIPTION ENCOUNTER (OUTPATIENT)
Age: 55
End: 2025-08-05

## 2025-08-05 RX ORDER — TIRZEPATIDE 5 MG/.5ML
5 INJECTION, SOLUTION SUBCUTANEOUS
Qty: 1 | Refills: 2 | Status: ACTIVE | COMMUNITY
Start: 2025-08-05 | End: 1900-01-01

## 2025-08-28 ENCOUNTER — APPOINTMENT (OUTPATIENT)
Dept: PLASTIC SURGERY | Facility: CLINIC | Age: 55
End: 2025-08-28

## 2025-08-28 VITALS
HEIGHT: 64 IN | BODY MASS INDEX: 36.7 KG/M2 | WEIGHT: 215 LBS | OXYGEN SATURATION: 98 % | SYSTOLIC BLOOD PRESSURE: 138 MMHG | TEMPERATURE: 98.1 F | HEART RATE: 92 BPM | DIASTOLIC BLOOD PRESSURE: 83 MMHG

## 2025-08-28 DIAGNOSIS — G56.23 LESION OF ULNAR NERVE, BILATERAL UPPER LIMBS: ICD-10-CM

## 2025-08-28 PROCEDURE — 99214 OFFICE O/P EST MOD 30 MIN: CPT

## (undated) DEVICE — SUT VICRYL 3-0 27" SH UNDYED

## (undated) DEVICE — LAP PAD W RING 18 X 18"

## (undated) DEVICE — SOL IRR POUR NS 0.9% 500ML

## (undated) DEVICE — WARMING BLANKET LOWER ADULT

## (undated) DEVICE — POSITIONER FOAM EGG CRATE ULNAR 2PCS (PINK)

## (undated) DEVICE — GLV 6.5 PROTEXIS (WHITE)

## (undated) DEVICE — ELCTR GROUNDING PAD ADULT COVIDIEN

## (undated) DEVICE — ELCTR ROCKER SWITCH PENCIL BLUE 10FT

## (undated) DEVICE — SOL IRR POUR H2O 500ML

## (undated) DEVICE — GLV 6 PROTEXIS (WHITE)

## (undated) DEVICE — DRSG MAMMARY SUPPORT LG SIZE 4

## (undated) DEVICE — DRSG MAMMARY SUPPORT SM SIZE 1

## (undated) DEVICE — ELCTR BOVIE TIP BLADE INSULATED 4" EDGE

## (undated) DEVICE — POSITIONER PATIENT SAFETY STRAP 3X60"

## (undated) DEVICE — DRAPE 3/4 SHEET 52X76"

## (undated) DEVICE — DRSG TEGADERM 4X4.75"

## (undated) DEVICE — PACK MINOR NO DRAPE

## (undated) DEVICE — DRAPE TOWEL BLUE 17" X 24"

## (undated) DEVICE — SUT MONOCRYL 4-0 27" PS-2 UNDYED

## (undated) DEVICE — GOWN LG

## (undated) DEVICE — DRSG STERISTRIPS 0.5 X 4"

## (undated) DEVICE — DRSG MAMMARY SUPPORT XL SIZE 5

## (undated) DEVICE — DRAPE INSTRUMENT POUCH 6.75" X 11"

## (undated) DEVICE — DRSG MAMMARY SUPPORT MED SIZE 3

## (undated) DEVICE — DRSG TELFA 3 X 8

## (undated) DEVICE — SUT SILK 2-0 30" SH

## (undated) DEVICE — RADIOGRAPHY DVC SPEC TRANSPEC

## (undated) DEVICE — DRAPE LAPAROTOMY TRANSVERSE

## (undated) DEVICE — VENODYNE/SCD SLEEVE CALF MEDIUM

## (undated) DEVICE — DRSG MAMMARY SUPPORT MED SIZE 2

## (undated) DEVICE — NDL HYPO SAFE 25G X 1" (ORANGE)